# Patient Record
Sex: FEMALE | Race: BLACK OR AFRICAN AMERICAN | Employment: FULL TIME | URBAN - METROPOLITAN AREA
[De-identification: names, ages, dates, MRNs, and addresses within clinical notes are randomized per-mention and may not be internally consistent; named-entity substitution may affect disease eponyms.]

---

## 2020-09-23 ENCOUNTER — TELEPHONE (OUTPATIENT)
Dept: ORTHOPEDIC SURGERY | Age: 60
End: 2020-09-23

## 2020-09-28 DIAGNOSIS — M25.561 RIGHT KNEE PAIN, UNSPECIFIED CHRONICITY: Primary | ICD-10-CM

## 2020-10-05 ENCOUNTER — APPOINTMENT (OUTPATIENT)
Dept: PHYSICAL THERAPY | Age: 60
End: 2020-10-05

## 2020-10-06 ENCOUNTER — HOSPITAL ENCOUNTER (OUTPATIENT)
Dept: PHYSICAL THERAPY | Age: 60
Discharge: HOME | End: 2020-10-06
Payer: COMMERCIAL

## 2020-10-06 PROCEDURE — 97161 PT EVAL LOW COMPLEX 20 MIN: CPT

## 2020-10-06 PROCEDURE — 97110 THERAPEUTIC EXERCISES: CPT

## 2020-10-06 NOTE — PROGRESS NOTES
PT INITIAL EVALUATION NOTE 2-15    Patient Name: Ivan Jasso  Date:10/6/2020  : 1960  [x]  Patient  Verified  Payor: BLUE CROSS / Plan: [facility] PPO / Product Type: PPO /    In time:1530  Out time:162  Total Treatment Time (min): 50  Visit #: 1     Treatment Area: Right knee pain, unspecified chronicity [M25.561]    SUBJECTIVE  Pain Level (0-10 scale): 0/10  Any medication changes, allergies to medications, adverse drug reactions, diagnosis change, or new procedure performed?: [] No    [x] Yes (see summary sheet for update)  Subjective:     Pt reports she had a R TKA on 6/15/2020 with Carmita Russo, she completed a normal 6 weeks course of physical therapy in our clinic but scheduled a follow-up with the surgeon due to continued swelling and weakness in the right knee. She reports she has also since developed some pain the left knee but is hoping to have another follow-up with Dr. Lisa Soni to have that knee injected. She states she \"just isn't quite satisfied with where the knee is, at this point. \"  She reports a lot of right knee stiffness in the mornings and notes it doesn't loosen up until about mid-day. She reports this is making her job working as a  more challenging. She is hoping that therapy will help to get her knee where she wants it so she can work without pain. PLOF: pt is an independent community ambulator without the need for an AD  Mechanism of Injury: acute post surgical onset  Previous Treatment/Compliance: good with normal post surgical course  PMHx/Surgical Hx: R TKA  Work Hx:   Living Situation: lives alone  Pt Goals: to be able to work without pain  Barriers: none  Motivation: good   Substance use: none   Cognition: A & O x 4        OBJECTIVE/EXAMINATION  Posture:   WNL  Gait and Functional Mobility:  Pt ambulates with antalgic gait with diminished step length on the on the left  Palpation: pt has moderate palpatory tenderness along the posterior aspect of the right knee worsened with resistive hamstring testing    Hip:  Strength AROM PROM     Right Left Right Left Right Left    Flexion 4-         Extension 5         Abduction 4+         Adduction 5        Knee:  Strength AROM PROM     Right Left Right Left Right Left    Flexion 5  118  120     Extension 5  -8  -4      *All strength measures are on a scale with 5 as a maximum, if a space is left blank it was not tested.     Joint Mobility Assessment: pt has a firm end fell with terminal knee extension on the right    Mid-patella Girth:  R = 41.5 cm    Flexibility: R hamstring flexibility appears to be compromised resulting in decreased knee extension      15 min Therapeutic Exercise:  [x] See flow sheet :   Rationale: increase ROM and increase strength to improve the patients ability to perform functional mobility without pain          With   [] TE   [] TA   [] neuro   [] other: Patient Education: [x] Review HEP    [] Progressed/Changed HEP based on:   [] positioning   [] body mechanics   [] transfers   [] heat/ice application    [] other:        Pain Level (0-10 scale) post treatment: 2/10      ASSESSMENT:      [x]  See Plan of [address] Thuan Larios PT,DPT 10/6/2020

## 2020-10-06 NOTE — PROGRESS NOTES
[address], [address]  Ph: [phone]    Fax: [phone]    Plan of Care/Statement of Necessity for Physical Therapy Services  2-15    Patient name: Ivan Jasso  : 1960  Provider#: [id number]  Referral source: Zack Inman MD      Medical/Treatment Diagnosis: Right knee pain, unspecified chronicity [M25.561]     Prior Hospitalization: see medical history     Comorbidities: none  Prior Level of Function: independent community Ambulator without the need for an AD  Medications: Verified on Patient Summary List    Start of Care: 10/6/2020      Onset Date: Lucero 15, 2020       The Plan of Care and following information is based on the information from the initial evaluation. Assessment/ key information: Pt is a pleasant 62 y.o.  female who presents to our clinic with pain and mobility limitation following a recent R TKA. Her primary deficits include decreased ROM, decreased strength, and impaired gait causing pain with long distance mobility.   She will likely benefit from skilled physical therapy services to address these deficits so she can return to athletic activity and prior level of function without pain    Evaluation Complexity History LOW Complexity : Zero comorbidities / personal factors that will impact the outcome / POC; Examination LOW Complexity : 1-2 Standardized tests and measures addressing body structure, function, activity limitation and / or participation in recreation  ;Presentation LOW Complexity : Stable, uncomplicated  ;Clinical Decision Making LOW Complexity : TUG = 10 seconds  Overall Complexity Rating: LOW     Problem List: pain affecting function, decrease ROM, decrease strength, edema affecting function, impaired gait/ balance, decrease activity tolerance and decrease flexibility/ joint mobility   Treatment Plan may include any combination of the following: Therapeutic exercise, Therapeutic activities, Neuromuscular re-education, Physical agent/modality, Gait/balance training, Manual therapy, Patient education and Functional mobility training  Patient / Family readiness to learn indicated by: asking questions and interest  Persons(s) to be included in education: patient (P)  Barriers to Learning/Limitations: None  Patient Goal (s): to be able to walk on the track without pain  Patient Self Reported Health Status: good  Rehabilitation Potential: good    Short Term Goals: To be accomplished in 6 treatments:  1)  Pt to improve R knee AROM with extension by 5 degrees so that she can demonstrate improved knee flexion and less hip hike in swing phase of gait on the right  2)  Pt to be independent with HEP    Long Term Goals: To be accomplished in 12 treatments:  1)  Pt to demonstrate full active right knee extension so the patient can exhibit a normal gait pattern and decrease the energy expenditure associated with gait so she can work without pain  2)  Pt to demonstrate a 1 cm decrease in R knee mid-patellar girth to be able to return to walking on the track without knee pain beyond 2/10    Frequency / Duration: Patient to be seen 2 times per week for up to 6 weeks. Patient/ Caregiver education and instruction: activity modification and exercises    [x]  Plan of care has been reviewed with NIKKO Rodriguez, PT, DPT 10/6/2020     ________________________________________________________________________    I certify that the above Therapy Services are being furnished while the patient is under my care. I agree with the treatment plan and certify that this therapy is necessary.     [de-identified] Signature:____________________  Date:____________Time: _________

## 2020-10-12 ENCOUNTER — HOSPITAL ENCOUNTER (OUTPATIENT)
Dept: PHYSICAL THERAPY | Age: 60
Discharge: HOME | End: 2020-10-12
Payer: COMMERCIAL

## 2020-10-12 PROCEDURE — 97016 VASOPNEUMATIC DEVICE THERAPY: CPT

## 2020-10-12 PROCEDURE — 97110 THERAPEUTIC EXERCISES: CPT

## 2020-10-12 PROCEDURE — 97116 GAIT TRAINING THERAPY: CPT

## 2020-10-12 PROCEDURE — 97032 APPL MODALITY 1+ESTIM EA 15: CPT

## 2020-10-12 NOTE — PROGRESS NOTES
PT DAILY TREATMENT NOTE 2-15    Patient Name: Munira Bolton  Date:10/12/2020  : 1960  [x]  Patient  Verified  Payor: BLUE CROSS / Plan: [facility] PPO / Product Type: PPO /    In time:  Out time:  Total Treatment Time (min): 64  Visit #:  2    Treatment Area: Pain in right knee [M25.561]    SUBJECTIVE  Pain Level (0-10 scale): 7/10  Any medication changes, allergies to medications, adverse drug reactions, diagnosis change, or new procedure performed?: [x] No    [] Yes (see summary sheet for update)  Subjective functional status/changes:   [] No changes reported  Pt reports her L knee is bothering her today more than anything but she has been working on her exercises    OBJECTIVE    Modality rationale: decrease edema and decrease inflammation to improve the patients ability to perform knee extension   Min Type Additional Details      10 [x] Estim: []Att   [x]Unatt    []TENS instruct                  [x]IFC  []Premod   []NMES                     []Other:  []w/US   [x]w/vaso   []w/heat  Position: seated  Location:  R hamstrings       []  Traction: [] Cervical       []Lumbar                       [] Prone          []Supine                       []Intermittent   []Continuous Lbs:  [] before manual  [] after manual  []w/heat    []  Ultrasound: []Continuous   [] Pulsed                       at: []1MHz   []3MHz Location:  W/cm2:    [] Paraffin         Location:   []w/heat    []  Ice     []  Heat  []  Ice massage Position:  Location:    []  Laser  []  Other: Position:  Location:   10   [x]  Vasopneumatic Device Pressure:       [x] lo [] med [] hi   Temperature:  34     [x] Skin assessment post-treatment:  [x]intact [x]redness- no adverse reaction    []redness - adverse reaction:         35 min Therapeutic Exercise:  [x] See flow sheet :   Rationale: increase ROM and increase strength to improve the patients ability to perform functional mobility    16 min Gait Trainin feet without AD device on level surfaces with supervision level of assist   Rationale: increase ROM and increase strength  to improve the patients ability to ambulate without an antlagic pattern and decrease energy expenditure          With   [] TE   [] TA   [] neuro   [] other: Patient Education: [x] Review HEP    [] Progressed/Changed HEP based on:   [] positioning   [] body mechanics   [] transfers   [] heat/ice application    [] other:      Other Objective/Functional Measures: Pt needs tactile and verbal cues to perform proper heel strike and toe off on the right to allow a natural swing and early stance phase of gait     Pain Level (0-10 scale) post treatment: 6/10    ASSESSMENT/Changes in Function:   Pt is progressing and by the end of gait training today was able t ambulate without a limp at all and noted she felt it was the most comfortable and natural she has felt since her surgery. Patient will continue to benefit from skilled PT services to modify and progress therapeutic interventions, address functional mobility deficits, address ROM deficits, address strength deficits and address imbalance/dizziness to attain remaining goals. [x]  See Plan of Care  []  See progress note/recertification  []  See Discharge Summary         Progress towards goals / Updated goals:  Short Term Goals: To be accomplished in 6 treatments:  Progressing  1)  Pt to improve R knee AROM with extension by 5 degrees so that she can demonstrate improved knee flexion and less hip hike in swing phase of gait on the right  2)  Pt to be independent with HEP     Long Term Goals:  To be accomplished in 12 treatments:  Progressing  1)  Pt to demonstrate full active right knee extension so the patient can exhibit a normal gait pattern and decrease the energy expenditure associated with gait so she can work without pain  2)  Pt to demonstrate a 1 cm decrease in R knee mid-patellar girth to be able to return to walking on the track without knee pain beyond 2/10    PLAN  [x]  Upgrade activities as tolerated     [x]  Continue plan of care  []  Update interventions per flow sheet       []  Discharge due to:_  []  Other:_      Tommy Sherman, PT,DPT 10/12/2020

## 2020-10-14 ENCOUNTER — HOSPITAL ENCOUNTER (OUTPATIENT)
Dept: PHYSICAL THERAPY | Age: 60
Discharge: HOME | End: 2020-10-14
Payer: COMMERCIAL

## 2020-10-14 PROCEDURE — 97016 VASOPNEUMATIC DEVICE THERAPY: CPT

## 2020-10-14 PROCEDURE — 97110 THERAPEUTIC EXERCISES: CPT

## 2020-10-14 NOTE — PROGRESS NOTES
PT DAILY TREATMENT NOTE 2-15    Patient Name: Dariel Starks  Date:10/14/2020  : 1960  [x]  Patient  Verified  Payor: BLUE CROSS / Plan: [id number] PPO / Product Type: PPO /    In time:1500  Out time:1600  Total Treatment Time (min): 60  Visit #:  3    Treatment Area: Pain in right knee [M25.561]    SUBJECTIVE  Pain Level (0-10 scale): 2/10- pt needed education on pain scale  Any medication changes, allergies to medications, adverse drug reactions, diagnosis change, or new procedure performed?: [x] No    [] Yes (see summary sheet for update)  Subjective functional status/changes:   [] No changes reported  \"My left knee hurts more than my right. \"    OBJECTIVE    Modality rationale: decrease edema and decrease inflammation to improve the patients ability to perform knee extension   Min Type Additional Details       [] Estim: []Att   []Unatt    []TENS instruct                  []IFC  []Premod   []NMES                     []Other:  []w/US   []w/vaso   []w/heat  Position: seated  Location:  R hamstrings       []  Traction: [] Cervical       []Lumbar                       [] Prone          []Supine                       []Intermittent   []Continuous Lbs:  [] before manual  [] after manual  []w/heat    []  Ultrasound: []Continuous   [] Pulsed                       at: []1MHz   []3MHz Location:  W/cm2:    [] Paraffin         Location:   []w/heat    []  Ice     []  Heat  []  Ice massage Position:  Location:    []  Laser  []  Other: Position:  Location:   10   [x]  Vasopneumatic Device Pressure:       [] lo [x] med [] hi   Temperature:  34     [x] Skin assessment post-treatment:  [x]intact [x]redness- no adverse reaction    []redness - adverse reaction:         45 min Therapeutic Exercise:  [x] See flow sheet :   Rationale: increase ROM and increase strength to improve the patients ability to perform functional mobility     min Gait Training:    Rationale: increase ROM and increase strength to improve the patients ability to ambulate without an antlagic pattern and decrease energy expenditure          With   [] TE   [] TA   [] neuro   [] other: Patient Education: [x] Review HEP    [] Progressed/Changed HEP based on:   [] positioning   [] body mechanics   [] transfers   [] heat/ice application    [] other:      Other Objective/Functional Measures: Pt needs tactile and verbal cues to perform proper heel strike and toe off on the right to allow a natural swing and early stance phase of gait. Pain Level (0-10 scale) post treatment: 0/10    ASSESSMENT/Changes in Function:   Addition of prone hand to encourage further right knee extension prior to gait on treadmill today. Addition of treadmill to encourage proper heel strike and equal weight bearing on B LE during gait. Pt tolerated increases well. Primary complaint is of left, non-surgical, knee. Patient will continue to benefit from skilled PT services to modify and progress therapeutic interventions, address functional mobility deficits, address ROM deficits, address strength deficits and address imbalance/dizziness to attain remaining goals. [x]  See Plan of Care  []  See progress note/recertification  []  See Discharge Summary         Progress towards goals / Updated goals:  Short Term Goals: To be accomplished in 6 treatments:  Progressing  1)  Pt to improve R knee AROM with extension by 5 degrees so that she can demonstrate improved knee flexion and less hip hike in swing phase of gait on the right  2)  Pt to be independent with HEP     Long Term Goals:  To be accomplished in 12 treatments:  Progressing  1)  Pt to demonstrate full active right knee extension so the patient can exhibit a normal gait pattern and decrease the energy expenditure associated with gait so she can work without pain  2)  Pt to demonstrate a 1 cm decrease in R knee mid-patellar girth to be able to return to walking on the track without knee pain beyond 2/10    PLAN  [x]  Upgrade activities as tolerated     [x]  Continue plan of care  []  Update interventions per flow sheet       []  Discharge due to:_  []  Other:_      Arturo Rose PT,DPT 10/14/2020

## 2020-10-19 ENCOUNTER — APPOINTMENT (OUTPATIENT)
Dept: PHYSICAL THERAPY | Age: 60
End: 2020-10-19
Payer: COMMERCIAL

## 2020-10-20 ENCOUNTER — HOSPITAL ENCOUNTER (OUTPATIENT)
Dept: PHYSICAL THERAPY | Age: 60
Discharge: HOME | End: 2020-10-20
Payer: COMMERCIAL

## 2020-10-20 PROCEDURE — 97110 THERAPEUTIC EXERCISES: CPT

## 2020-10-20 NOTE — PROGRESS NOTES
PT DAILY TREATMENT NOTE 2-15    Patient Name: Munira Bolton  Date:10/20/2020  : 1960  [x]  Patient  Verified  Payor: BLUE CROSS / Plan: [facility] PPO / Product Type: PPO /    In time:0309pm  Out time:411 pm  Total Treatment Time (min): 58  Visit #:  4    Treatment Area: Pain in right knee [M25.561]    SUBJECTIVE  Pain Level (0-10 scale): 0/10 on R knee however L knee is 8/10  Any medication changes, allergies to medications, adverse drug reactions, diagnosis change, or new procedure performed?: [x] No    [] Yes (see summary sheet for update)  Subjective functional status/changes:   [] No changes reported  Pt reports having increased pain in her L knee today and considering getting injection in it. Pt noted that she is not having any pain in her sx knee.    OBJECTIVE    Modality rationale: decrease edema, decrease inflammation and decrease pain to improve the patients ability to tolerate knee movement bilat   Min Type Additional Details       [] Estim: []Att   []Unatt    []TENS instruct                  []IFC  []Premod   []NMES                     []Other:  []w/US   []w/ice   []w/heat  Position:  Location:       []  Traction: [] Cervical       []Lumbar                       [] Prone          []Supine                       []Intermittent   []Continuous Lbs:  [] before manual  [] after manual  []w/heat    []  Ultrasound: []Continuous   [] Pulsed                       at: []1MHz   []3MHz Location:  W/cm2:    [] Paraffin         Location:   []w/heat   10 [x]  Ice     []  Heat  []  Ice massage Position: seated LE ext bilat Location: bilat knees    []  Laser  []  Other: Position:  Location:      []  Vasopneumatic Device Pressure:       [] lo [] med [] hi   Temperature:      [x] Skin assessment post-treatment:  [x]intact []redness- no adverse reaction    []redness - adverse reaction:         52 min Therapeutic Exercise:  [x] See flow sheet :   Rationale: increase ROM and increase strength to improve the patients ability to increase functional knee motion bilat with decreased c/o. With   [] TE   [] TA   [] neuro   [] other: Patient Education: [x] Review HEP    [] Progressed/Changed HEP based on:   [] positioning   [] body mechanics   [] transfers   [] heat/ice application    [] other:        Pain Level (0-10 scale) post treatment: 2/10 L, 0/10 R    ASSESSMENT/Changes in Function:   Pt tolerated all ex with reports of increased tolerance to active knee movement with progression of ex and stretching. Patient will continue to benefit from skilled PT services to modify and progress therapeutic interventions, address functional mobility deficits, address ROM deficits and address strength deficits to attain remaining goals.      [x]  See Plan of Care  []  See progress note/recertification  []  See Discharge Summary         Progress towards goals / Updated goals:  Short Term Goals: To be accomplished in 6 treatments:  Progressing  1)  Pt to improve R knee AROM with extension by 5 degrees so that she can demonstrate improved knee flexion and less hip hike in swing phase of gait on the right  2)  Pt to be independent with HEP     Long Term Goals: To be accomplished in 12 treatments:  Progressing  1)  Pt to demonstrate full active right knee extension so the patient can exhibit a normal gait pattern and decrease the energy expenditure associated with gait so she can work without pain  2)  Pt to demonstrate a 1 cm decrease in R knee mid-patellar girth to be able to return to walking on the track without knee pain beyond 2/10    PLAN  [x]  Upgrade activities as tolerated     [x]  Continue plan of care  []  Update interventions per flow sheet       []  Discharge due to:_  []  Other:_      Kamari Arevalo  10/20/2020

## 2020-10-21 ENCOUNTER — APPOINTMENT (OUTPATIENT)
Dept: PHYSICAL THERAPY | Age: 60
End: 2020-10-21
Payer: COMMERCIAL

## 2020-10-26 ENCOUNTER — HOSPITAL ENCOUNTER (OUTPATIENT)
Dept: PHYSICAL THERAPY | Age: 60
Discharge: HOME | End: 2020-10-26
Payer: COMMERCIAL

## 2020-10-26 PROCEDURE — 97110 THERAPEUTIC EXERCISES: CPT

## 2020-10-26 NOTE — PROGRESS NOTES
PT DAILY TREATMENT NOTE 2-15    Patient Name: Ed Zapata  Date:10/26/2020  : 1960  [x]  Patient  Verified  Payor: BLUE CROSS / Plan: [facility] PPO / Product Type: PPO /    In time:0300pm  Out time:401  Total Treatment Time (min): 61  Visit #:  5    Treatment Area: Pain in right knee [M25.561]    SUBJECTIVE  Pain Level (0-10 scale): 0/10 on R knee however L knee is 8/10  Any medication changes, allergies to medications, adverse drug reactions, diagnosis change, or new procedure performed?: [x] No    [] Yes (see summary sheet for update)  Subjective functional status/changes:   [] No changes reported  \"the rain always makes me achy. \"      OBJECTIVE    Modality rationale: decrease edema, decrease inflammation and decrease pain to improve the patients ability to tolerate knee movement bilat   Min Type Additional Details       [] Estim: []Att   []Unatt    []TENS instruct                  []IFC  []Premod   []NMES                     []Other:  []w/US   []w/ice   []w/heat  Position:  Location:       []  Traction: [] Cervical       []Lumbar                       [] Prone          []Supine                       []Intermittent   []Continuous Lbs:  [] before manual  [] after manual  []w/heat    []  Ultrasound: []Continuous   [] Pulsed                       at: []1MHz   []3MHz Location:  W/cm2:    [] Paraffin         Location:   []w/heat   8 []  Ice     [x]  Heat  []  Ice massage Position: prone   Location: right hamstring    []  Laser  []  Other: Position:  Location:      []  Vasopneumatic Device Pressure:       [] lo [] med [] hi   Temperature:      [x] Skin assessment post-treatment:  [x]intact []redness- no adverse reaction    []redness - adverse reaction:         53 min Therapeutic Exercise:  [x] See flow sheet :   Rationale: increase ROM and increase strength to improve the patients ability to increase functional knee motion bilat with decreased c/o.                With   [] TE   [] TA   [] neuro   [] other: Patient Education: [x] Review HEP    [] Progressed/Changed HEP based on:   [] positioning   [] body mechanics   [] transfers   [] heat/ice application    [] other:        Pain Level (0-10 scale) post treatment: 2/10 L, 0/10 R    ASSESSMENT/Changes in Function:   Pt tolerated all ex with reports of increased tolerance to active knee movement with progression of ex and stretching. Focused on increasing knee extension with prone stretch with heat, followed by gait on treadmill and gait within room to formulate more equalized gait pattern. Pt frequently complaints of left knee pain, which limits her mobility, however is aware prescription is only to treat right knee. Patient will continue to benefit from skilled PT services to modify and progress therapeutic interventions, address functional mobility deficits, address ROM deficits and address strength deficits to attain remaining goals.      [x]  See Plan of Care  []  See progress note/recertification  []  See Discharge Summary         Progress towards goals / Updated goals:  Short Term Goals: To be accomplished in 6 treatments:  Progressing  1)  Pt to improve R knee AROM with extension by 5 degrees so that she can demonstrate improved knee flexion and less hip hike in swing phase of gait on the right  2)  Pt to be independent with HEP     Long Term Goals: To be accomplished in 12 treatments:  Progressing  1)  Pt to demonstrate full active right knee extension so the patient can exhibit a normal gait pattern and decrease the energy expenditure associated with gait so she can work without pain  2)  Pt to demonstrate a 1 cm decrease in R knee mid-patellar girth to be able to return to walking on the track without knee pain beyond 2/10    PLAN  [x]  Upgrade activities as tolerated     [x]  Continue plan of care  []  Update interventions per flow sheet       []  Discharge due to:_  []  Other:_      Parish Nova, PT, DPT  10/26/2020

## 2020-10-28 ENCOUNTER — HOSPITAL ENCOUNTER (OUTPATIENT)
Dept: PHYSICAL THERAPY | Age: 60
Discharge: HOME | End: 2020-10-28
Payer: COMMERCIAL

## 2020-11-02 ENCOUNTER — HOSPITAL ENCOUNTER (OUTPATIENT)
Dept: PHYSICAL THERAPY | Age: 60
Discharge: HOME | End: 2020-11-02
Payer: COMMERCIAL

## 2020-11-02 PROCEDURE — 97110 THERAPEUTIC EXERCISES: CPT

## 2020-11-02 NOTE — PROGRESS NOTES
PT DAILY TREATMENT NOTE 2-15    Patient Name: Trevon Parker  Date:2020  : 1960  [x]  Patient  Verified  Payor: BLUE CROSS / Plan: [facility] PPO / Product Type: PPO /    In time: 4482 PM Out time:4:10 PM  Total Treatment Time (min): 55  Visit #: 6    Treatment Area: Pain in right knee [M25.561]    SUBJECTIVE  Pain Level (0-10 scale): 4  Any medication changes, allergies to medications, adverse drug reactions, diagnosis change, or new procedure performed?: [x] No    [] Yes (see summary sheet for update)  Subjective functional status/changes:   [] No changes reported  My knee continues to be painful. I may continue to get the gel treatment. I still find it difficulty to straighten my knee. OBJECTIVE      55 min Therapeutic Exercise:  [x] See flow sheet :   Rationale: increase ROM, increase strength, improve coordination, improve balance and increase proprioception to improve the patients ability to return to normal gait. With   [] TE   [] TA   [] neuro   [] other: Patient Education: [x] Review HEP    [] Progressed/Changed HEP based on:   [] positioning   [] body mechanics   [] transfers   [] heat/ice application    [] other:      Pain Level (0-10 scale) post treatment: 4    ASSESSMENT/Changes in Function:     Patient continues to have right knee weakness and extension lag. Continue to work on strengthening program of the quads and establish a comprehensive exercise program. Patient will continue to benefit from skilled PT services to address ROM deficits and address strength deficits to attain remaining goals.      []  See Plan of Care  []  See progress note/recertification  []  See Discharge Summary         Progress towards goals / Updated goals:    Short Term Goals: To be accomplished in 6 treatments:   1)  Pt to improve R knee AROM with extension by 5 degrees so that she can demonstrate improved knee flexion and less hip hike in swing phase of gait on the right  2)  Pt to be independent with HEP     Long Term Goals: To be accomplished in 12 treatments:    1)  Pt to demonstrate full active right knee extension so the patient can exhibit a normal gait pattern and decrease the energy expenditure associated with gait so she can work without pain  2)  Pt to demonstrate a 1 cm decrease in R knee mid-patellar girth to be able to return to walking on the track without knee pain beyond 2/10       PLAN  []  Upgrade activities as tolerated     [x]  Continue plan of care  []  Update interventions per flow sheet       []  Discharge due to:_  []  Other:_      David Astudillo, PT 11/2/2020

## 2020-11-04 ENCOUNTER — HOSPITAL ENCOUNTER (OUTPATIENT)
Dept: PHYSICAL THERAPY | Age: 60
Discharge: HOME | End: 2020-11-04
Payer: COMMERCIAL

## 2020-11-04 PROCEDURE — 97110 THERAPEUTIC EXERCISES: CPT

## 2020-11-04 NOTE — PROGRESS NOTES
PT DAILY TREATMENT NOTE 2-15    Patient Name: Britta Rosenberg  Date:2020  : 1960  [x]  Patient  Verified  Payor: [employer]UE [employer]OTT / Plan: [id number] PPO / Product Type: PPO /    In time:3:00 PM  Out time:4:00 PM  Total Treatment Time (min): 60  Visit #:  7    Treatment Area: Pain in right knee [M25.561]    SUBJECTIVE  Pain Level (0-10 scale): 3    Any medication changes, allergies to medications, adverse drug reactions, diagnosis change, or new procedure performed?: [x] No    [] Yes (see summary sheet for update)    Subjective functional status/changes:   [] No changes reported    I hope to get rid of this limp soon. OBJECTIVE      60 min Therapeutic Exercise:  [x] See flow sheet :   Rationale: increase ROM, increase strength, improve coordination, improve balance and increase proprioception to improve the patients ability to return to  Normal gait. With   [] TE   [] TA   [] neuro   [] other: Patient Education: [x] Review HEP    [] Progressed/Changed HEP based on:   [] positioning   [] body mechanics   [] transfers   [] heat/ice application    [] other:      Other Objective/Functional Measures:   OBJECTIVE/EXAMINATION  Posture:   WNL  Gait and Functional Mobility:  Pt ambulates with antalgic gait with diminished step length on the on the left  Palpation: pt has moderate palpatory tenderness along the posterior aspect of the right knee worsened with resistive hamstring testing               Hip:   Strength AROM PROM       Right Left Right Left Right Left     Flexion 4-               Extension 5               Abduction 4+               Adduction 5             Knee:   Strength AROM PROM       Right Left Right Left Right Left     Flexion 5   118   120       Extension 5   -8   -4        *All strength measures are on a scale with 5 as a maximum, if a space is left blank it was not tested.     Joint Mobility Assessment: pt has a firm end fell with terminal knee extension on the right     Mid-patella Girth:  R = 41.5 cm     Flexibility: R hamstring flexibility appears to be compromised       Pain Level (0-10 scale) post treatment: 3    ASSESSMENT/Changes in Function:      Patient continues to have right knee weakness and extension lag. Continue to work on strengthening program of the quads and establish a comprehensive exercise program. Patient will continue to benefit from skilled PT services to address ROM deficits and address strength deficits to attain remaining goals  Patient will continue to benefit from skilled PT services to address functional mobility deficits, address ROM deficits and address strength deficits to attain remaining goals.      [x]  See Plan of Care  []  See progress note/recertification  []  See Discharge Summary         Progress towards goals / Updated goals:  *Short Term Goals: To be accomplished in 6 treatments:   Goal: Pt to improve R knee AROM with extension by 5 degrees so that she can demonstrate improved knee flexion and less hip hike in swing phase of gait on the right  Status at discharge:    Goal:  Pt to be independent with HEP  Status at discharge     Long Term Goals: To be accomplished in 12 treatments:    Goal:  Pt to demonstrate full active right knee extension so the patient can exhibit a normal gait pattern and decrease the energy expenditure associated with gait so she can work without pain  Status at discharge:    Goal:  Pt to demonstrate a 1 cm decrease in R knee mid-patellar girth to be able to return to walking on the track without knee pain beyond 2/10  Status at discharge:  PLAN  []  Upgrade activities as tolerated     [x]  Continue plan of care  []  Update interventions per flow sheet       []  Discharge due to:_  []  Other:_      Christina Pierre, PT 11/4/2020

## 2020-11-11 ENCOUNTER — APPOINTMENT (OUTPATIENT)
Dept: PHYSICAL THERAPY | Age: 60
End: 2020-11-11
Payer: COMMERCIAL

## 2020-11-16 ENCOUNTER — HOSPITAL ENCOUNTER (OUTPATIENT)
Dept: PHYSICAL THERAPY | Age: 60
Discharge: HOME | End: 2020-11-16
Payer: COMMERCIAL

## 2020-11-16 PROCEDURE — 97110 THERAPEUTIC EXERCISES: CPT

## 2020-11-16 NOTE — PROGRESS NOTES
PT DAILY TREATMENT NOTE 2-15    Patient Name: Lili Moon  Date:2020  : 1960  [x]  Patient  Verified  Payor: BLUE CROSS / Plan: [facility] PPO / Product Type: PPO /    In time:15:08  Out time:16:02  Total Treatment Time (min): 47  Visit #: 8    Treatment Area: Pain in right knee [M25.561]    SUBJECTIVE  Pain Level (0-10 scale): 3/10  Any medication changes, allergies to medications, adverse drug reactions, diagnosis change, or new procedure performed?: [x] No    [] Yes (see summary sheet for update)  Subjective functional status/changes:   [] No changes reported  Pt reports she is doing well, just having some stiffness. OBJECTIVE    Modality rationale: decrease edema, decrease inflammation and decrease pain to improve the patients ability to ambulate with normalized gait. Min Type Additional Details       [] Estim: []Att   []Unatt    []TENS instruct                  []IFC  []Premod   []NMES                     []Other:  []w/US   []w/ice   []w/heat  Position:  Location:       []  Traction: [] Cervical       []Lumbar                       [] Prone          []Supine                       []Intermittent   []Continuous Lbs:  [] before manual  [] after manual  []w/heat    []  Ultrasound: []Continuous   [] Pulsed                       at: []1MHz   []3MHz Location:  W/cm2:    [] Paraffin         Location:   []w/heat   10 [x]  Ice     []  Heat  []  Ice massage Position: Seated  Location: Ice pack provided over L knee due to pain with heel prop    []  Laser  []  Other: Position:  Location:      []  Vasopneumatic Device Pressure:       [] lo [] med [] hi   [] w/ ice    Temperature:      [x] Skin assessment post-treatment:  [x]intact []redness- no adverse reaction    []redness - adverse reaction:       44 min Therapeutic Exercise:  [x] See flow sheet :   Rationale: increase ROM and increase strength to improve the patients ability to ambulate with normalized gait.          With [x] TE   [] TA   [] neuro   [] other: Patient Education: [x] Review HEP    [] Progressed/Changed HEP based on:   [] positioning   [] body mechanics   [] transfers   [] heat/ice application    [x] other:      Other Objective/Functional Measures: Added step ups. Increased time for fitter stretch. Pain Level (0-10 scale) post treatment: 2/10    ASSESSMENT/Changes in Function:   Pt tolerated therapy session well today. Emphasis of treatment was on stretching and exercises addressing ROM and LE strength due to patient reports of tightness. Progressed to standing strengthening exercises to promote functional strengthening. Pt requires verbal cueing for proper performance of exercises. Patient will continue to benefit from skilled PT services to modify and progress therapeutic interventions, address functional mobility deficits, address ROM deficits, address strength deficits, analyze and address soft tissue restrictions and analyze and cue movement patterns to attain remaining goals.      [x]  See Plan of Care  []  See progress note/recertification  []  See Discharge Summary         Progress towards goals / Updated goals:  Short Term Goals: To be accomplished in 6 treatments:   Goal: Pt to improve R knee AROM with extension by 5 degrees so that she can demonstrate improved knee flexion and less hip hike in swing phase of gait on the right  Status at discharge:     Goal:  Pt to be independent with HEP  Status at discharge     Long Term Goals: To be accomplished in 12 treatments:    Goal:  Pt to demonstrate full active right knee extension so the patient can exhibit a normal gait pattern and decrease the energy expenditure associated with gait so she can work without pain  Status at discharge:     Goal:  Pt to demonstrate a 1 cm decrease in R knee mid-patellar girth to be able to return to walking on the track without knee pain beyond 2/10  Status at discharge:    PLAN  [x]  Upgrade activities as tolerated     [x] Continue plan of care  []  Update interventions per flow sheet       []  Discharge due to:_  []  Other:_      Joellen Rossi PT, DPT 11/16/2020

## 2020-11-18 ENCOUNTER — APPOINTMENT (OUTPATIENT)
Dept: PHYSICAL THERAPY | Age: 60
End: 2020-11-18
Payer: COMMERCIAL

## 2020-11-23 ENCOUNTER — HOSPITAL ENCOUNTER (OUTPATIENT)
Dept: PHYSICAL THERAPY | Age: 60
Discharge: HOME | End: 2020-11-23
Payer: COMMERCIAL

## 2020-11-23 PROCEDURE — 97110 THERAPEUTIC EXERCISES: CPT

## 2020-11-25 ENCOUNTER — APPOINTMENT (OUTPATIENT)
Dept: PHYSICAL THERAPY | Age: 60
End: 2020-11-25
Payer: COMMERCIAL

## 2020-11-30 ENCOUNTER — APPOINTMENT (OUTPATIENT)
Dept: PHYSICAL THERAPY | Age: 60
End: 2020-11-30
Payer: COMMERCIAL

## 2020-12-02 ENCOUNTER — APPOINTMENT (OUTPATIENT)
Dept: PHYSICAL THERAPY | Age: 60
End: 2020-12-02

## 2020-12-04 ENCOUNTER — APPOINTMENT (OUTPATIENT)
Dept: PHYSICAL THERAPY | Age: 60
End: 2020-12-04

## 2020-12-07 ENCOUNTER — APPOINTMENT (OUTPATIENT)
Dept: PHYSICAL THERAPY | Age: 60
End: 2020-12-07

## 2020-12-09 ENCOUNTER — APPOINTMENT (OUTPATIENT)
Dept: PHYSICAL THERAPY | Age: 60
End: 2020-12-09

## 2020-12-14 DIAGNOSIS — M25.561 RIGHT KNEE PAIN, UNSPECIFIED CHRONICITY: Primary | ICD-10-CM

## 2020-12-14 RX ORDER — NAPROXEN 500 MG/1
500 TABLET ORAL 2 TIMES DAILY WITH MEALS
COMMUNITY

## 2020-12-14 RX ORDER — NAPROXEN 500 MG/1
500 TABLET ORAL 2 TIMES DAILY WITH MEALS
Qty: 14 TAB | Refills: 0 | OUTPATIENT
Start: 2020-12-14

## 2021-02-03 NOTE — PROGRESS NOTES
[address]  [place], [place]  Ph: [phone]    Fax: [phone]    Discharge Summary  2-15    Patient name: Catherine Walsh  : 1960  Provider#: [id number]  Referral source: Paul Sweeney MD      Medical/Treatment Diagnosis: Pain in right knee [M25.561]     Prior Hospitalization: see medical history     Comorbidities: See Plan of Care  Prior Level of Function:See Plan of Care  Medications: Verified on Patient Summary List    Start of Care: 10/6/2020      Onset Date:6/15/2020   Visits from Start of Care: 9 Missed Visits: 4  Reporting Period : 10/6/2020 to 2/3/2021      ASSESSMENT/SUMMARY OF CARE:   Pt was progressing well at the the time of her last therapy visit but stopped coming to therapy without further contact with our office. As such she will be discharged due to non-compliance with her plan of care at this time.         RECOMMENDATIONS:  [x]Discontinue therapy: []Patient has reached or is progressing toward set goals      [x]Patient is non-compliant or has abdicated      []Due to lack of appreciable progress towards set goals      []Other    Carolynn Gonzalez, PT 2/3/2021

## 2021-04-28 DIAGNOSIS — M79.672 LEFT FOOT PAIN: Primary | ICD-10-CM

## 2022-01-26 DIAGNOSIS — M25.571 RIGHT ANKLE PAIN, UNSPECIFIED CHRONICITY: ICD-10-CM

## 2022-01-26 DIAGNOSIS — M25.572 LEFT ANKLE PAIN, UNSPECIFIED CHRONICITY: Primary | ICD-10-CM

## 2022-02-09 DIAGNOSIS — M25.571 RIGHT ANKLE PAIN, UNSPECIFIED CHRONICITY: Primary | ICD-10-CM

## 2022-02-09 DIAGNOSIS — M25.572 LEFT ANKLE PAIN, UNSPECIFIED CHRONICITY: ICD-10-CM

## 2022-08-23 ENCOUNTER — OFFICE VISIT (OUTPATIENT)
Dept: ORTHOPEDIC SURGERY | Age: 62
End: 2022-08-23
Payer: COMMERCIAL

## 2022-08-23 VITALS — WEIGHT: 167 LBS | HEIGHT: 67 IN | BODY MASS INDEX: 26.21 KG/M2

## 2022-08-23 DIAGNOSIS — M17.12 OSTEOARTHRITIS OF LEFT KNEE, UNSPECIFIED OSTEOARTHRITIS TYPE: ICD-10-CM

## 2022-08-23 DIAGNOSIS — M25.562 LEFT KNEE PAIN, UNSPECIFIED CHRONICITY: Primary | ICD-10-CM

## 2022-08-23 DIAGNOSIS — M25.562 LEFT KNEE PAIN, UNSPECIFIED CHRONICITY: ICD-10-CM

## 2022-08-23 PROBLEM — R91.8 MULTIPLE PULMONARY NODULES: Status: ACTIVE | Noted: 2022-08-23

## 2022-08-23 PROBLEM — D51.3 OTHER DIETARY VITAMIN B12 DEFICIENCY ANEMIA: Status: ACTIVE | Noted: 2021-08-15

## 2022-08-23 PROBLEM — R63.4 ABNORMAL WEIGHT LOSS: Status: ACTIVE | Noted: 2022-08-23

## 2022-08-23 PROBLEM — E55.9 VITAMIN D DEFICIENCY: Status: ACTIVE | Noted: 2022-08-23

## 2022-08-23 PROBLEM — I10 HYPERTENSION: Status: ACTIVE | Noted: 2021-08-15

## 2022-08-23 PROBLEM — D64.9 ANEMIA: Status: ACTIVE | Noted: 2022-08-23

## 2022-08-23 PROBLEM — M89.8X9 BONE PAIN: Status: ACTIVE | Noted: 2022-08-23

## 2022-08-23 PROBLEM — S86.912A STRAIN OF LEFT KNEE: Status: ACTIVE | Noted: 2021-08-15

## 2022-08-23 PROCEDURE — 20611 DRAIN/INJ JOINT/BURSA W/US: CPT | Performed by: ORTHOPAEDIC SURGERY

## 2022-08-23 PROCEDURE — 99214 OFFICE O/P EST MOD 30 MIN: CPT | Performed by: ORTHOPAEDIC SURGERY

## 2022-08-23 RX ORDER — LIDOCAINE HYDROCHLORIDE 10 MG/ML
10 INJECTION, SOLUTION EPIDURAL; INFILTRATION; INTRACAUDAL; PERINEURAL
COMMUNITY

## 2022-08-23 RX ORDER — METHOTREXATE 2.5 MG/1
TABLET ORAL
COMMUNITY
Start: 2022-06-06

## 2022-08-23 RX ORDER — LETROZOLE 2.5 MG/1
2.5 TABLET, FILM COATED ORAL
COMMUNITY

## 2022-08-23 RX ORDER — ASCORBIC ACID 500 MG
500 TABLET ORAL DAILY
COMMUNITY

## 2022-08-23 RX ORDER — DICLOFENAC SODIUM 10 MG/G
1 GEL TOPICAL
COMMUNITY
Start: 2022-06-27

## 2022-08-23 RX ORDER — CLOBETASOL PROPIONATE 0.5 MG/G
CREAM TOPICAL 2 TIMES DAILY
COMMUNITY
Start: 2022-07-08

## 2022-08-23 RX ORDER — LANOLIN ALCOHOL/MO/W.PET/CERES
CREAM (GRAM) TOPICAL
COMMUNITY

## 2022-08-23 RX ORDER — LIDOCAINE HYDROCHLORIDE 10 MG/ML
9 INJECTION INFILTRATION; PERINEURAL ONCE
Status: COMPLETED | OUTPATIENT
Start: 2022-08-23 | End: 2022-08-23

## 2022-08-23 RX ORDER — MELOXICAM 15 MG/1
TABLET ORAL
COMMUNITY
Start: 2022-08-19

## 2022-08-23 RX ORDER — TRIAMCINOLONE ACETONIDE 40 MG/ML
40 INJECTION, SUSPENSION INTRA-ARTICULAR; INTRAMUSCULAR ONCE
Status: COMPLETED | OUTPATIENT
Start: 2022-08-23 | End: 2022-08-23

## 2022-08-23 RX ORDER — IBUPROFEN 600 MG/1
600 TABLET ORAL
COMMUNITY
Start: 2022-06-24

## 2022-08-23 RX ORDER — TRIAMCINOLONE ACETONIDE 40 MG/ML
40 INJECTION, SUSPENSION INTRA-ARTICULAR; INTRAMUSCULAR
COMMUNITY

## 2022-08-23 RX ADMIN — LIDOCAINE HYDROCHLORIDE 9 ML: 10 INJECTION INFILTRATION; PERINEURAL at 15:00

## 2022-08-23 RX ADMIN — TRIAMCINOLONE ACETONIDE 40 MG: 40 INJECTION, SUSPENSION INTRA-ARTICULAR; INTRAMUSCULAR at 15:00

## 2022-08-23 NOTE — PROGRESS NOTES
Name: Tea Garcia    : 1960     Service Dept: [facility]    Chief Complaint   Patient presents with    Knee Pain     Left           Visit Vitals  Ht 5' 7\" (1.702 m)   Wt 167 lb (75.8 kg)   BMI 26.16 kg/m²        No Known Allergies     Current Outpatient Medications   Medication Sig Dispense Refill    clobetasoL (TEMOVATE) 0.05 % topical cream Apply  to affected area two (2) times a day. diclofenac (VOLTAREN) 1 % gel Apply 1 g to affected area every four (4) hours as needed. ascorbic acid, vitamin C, (VITAMIN C) 500 mg tablet Take 500 mg by mouth daily. ascorbic acid (VITAMIN C) 500 mg chewable tablet Vitamin C With Mariana Hips 500 mg chewable tablet   1 TABLET,CHEWABLE ORALLY DAILY. ibuprofen (MOTRIN) 600 mg tablet Take 600 mg by mouth every eight (8) hours as needed. letrozole (FEMARA) 2.5 mg tablet Take 2.5 mg by mouth nightly. lidocaine, PF, (XYLOCAINE) 10 mg/mL (1 %) injection 10 mg.      meloxicam (MOBIC) 15 mg tablet       methotrexate (RHEUMATREX) 2.5 mg tablet TAKE 6 TABLET BY MOUTH ONCE WEEKLY      triamcinolone acetonide (Kenalog) 40 mg/mL injection 40 mg.      naproxen (NAPROSYN) 500 mg tablet Take 500 mg by mouth two (2) times daily (with meals).        Current Facility-Administered Medications   Medication Dose Route Frequency Provider Last Rate Last Admin    lidocaine (XYLOCAINE) 10 mg/mL (1 %) injection 9 mL  9 mL Other ONCE Antonio hSi MD        triamcinolone acetonide (KENALOG-40) 40 mg/mL injection 40 mg  40 mg Intra artICUlar ONCE Regino Tang MD          Patient Active Problem List   Diagnosis Code    Abnormal weight loss R63.4    Anemia D64.9    Arthralgia of left knee M25.562    Bone pain M89.8X9    Hypertension I10    Hypertrophy of breast N62    Malignant neoplasm of breast (female), unspecified site C50.919    Multiple pulmonary nodules R91.8    Other dietary vitamin B12 deficiency anemia D51.3 Strain of left knee [id number]    Vitamin D deficiency E55.9      No family history on file. Social History     Socioeconomic History    Marital status: SINGLE      No past surgical history on file. No past medical history on file. I have reviewed and agree with [address] [place] [address] Nw and ROS and intake form in chart and the record furthermore I have reviewed prior medical record(s) regarding this patients care during this appointment. Review of Systems:   Patient is a pleasant appearing individual, appropriately dressed, well hydrated, well nourished, who is alert, appropriately oriented for age, and in no acute distress with a normal gait and normal affect who does not appear to be in any significant pain. Physical Exam:  Left Knee -Decrease range of motion with flexion, Knee arc of greater than 50 degrees, Some crepitation, Grossly neurovascularly intact, Good cap refill, No skin lesion, Moderate swelling, some gross instability, Some quadriceps weakness, Kellgren and Stephon at least grade 3    Right Knee - Full Range of Motion, No crepitation, Grossly neurovascularly intact, Good cap refill, No skin lesion, No swelling, No gross instability, No quadriceps weakness     Procedure Documentation:    I discussed in detail the risks, benefits and complications of an injection which included but are not limited to infection, skin reactions, hot swollen joint, and anaphylaxis with the patient. The patient verbalized understanding and gave informed consent for the injection. The patient's knee was flexed to 90° and the skin prepped using sterile alcohol solution. A sterile needle was inserted into the left knee and the mixture of 9 mL Lidocaine 1%, 1 mL Kenalog 40 mg was injected under sterile technique. The needle was withdrawn and the puncture site sealed with a Band-Aid.       Technique: Under sterile conditions a [employer] ultrasound unit with a variable frequency (7.0-14.0 MHz) linear transducer was used to localize the placement of needle into the left knee joint. Findings: Successful needle placement for knee injection. Final images were taken and saved for permanent record. The patient tolerated the injection well. The patient was instructed to call the office immediately if there is any pain, redness, warmth, fever, or chills. Encounter Diagnoses     ICD-10-CM ICD-9-CM   1. Left knee pain, unspecified chronicity  M25.562 719.46   2. Osteoarthritis of left knee, unspecified osteoarthritis type  M17.12 715.96       HPI:  The patient is here with a chief complaint of left knee pain, dull, throbbing pain, progressively getting worse. Failed conservative treatment. X-rays are positive for severe OA. Assessment/Plan:  Plan will be for left knee cortisone injection. We will match it up to the other side for left total knee replacement. General medical clearance. No history of blood clots, does not take any blood thinners. As part of continued conservative pain management options the patient was advised to utilize Tylenol or OTC NSAIDS as long as it is not medically contraindicated. Return to Office: Follow-up and Dispositions    Return for schedule for surgery. Scribed by Jose Weber LPN as dictated by [facility] NATHAN Rose MD.  Documentation True and Accepted Antonio Rose MD

## 2022-08-23 NOTE — PATIENT INSTRUCTIONS
Knee Arthritis: Care Instructions  Your Care Instructions     Knee arthritis is a breakdown of the cartilage that cushions your knee joint. When the cartilage wears down, your bones rub against each other. This causes pain and stiffness. Knee arthritis tends to get worse with time. Treatment for knee arthritis involves reducing pain, making the leg muscles stronger, and staying at a healthy body weight. The treatment usually does not improve the health of the cartilage, but it can reduce pain and improve how well your knee works. You can take simple measures to protect your knee joints, ease your pain, and help you stay active. Follow-up care is a key part of your treatment and safety. Be sure to make and go to all appointments, and call your doctor if you are having problems. It's also a good idea to know your test results and keep a list of the medicines you take. How can you care for yourself at home? Know that knee arthritis will cause more pain on some days than on others. Stay at a healthy weight. Lose weight if you are overweight. When you stand up, the pressure on your knees from every pound of body weight is multiplied four times. So if you lose 10 pounds, you will reduce the pressure on your knees by 40 pounds. Talk to your doctor or physical therapist about exercises that will help ease joint pain. Stretch to help prevent stiffness and to prevent injury before you exercise. You may enjoy gentle forms of yoga to help keep your knee joints and muscles flexible. Walk instead of jog. Ride a bike. This makes your thigh muscles stronger and takes pressure off your knee. Wear well-fitting and comfortable shoes. Exercise in chest-deep water. This can help you exercise longer with less pain. Avoid exercises that include squatting or kneeling. They can put a lot of strain on your knees. Talk to your doctor to make sure that the exercise you do is not making the arthritis worse.   Do not sit for long periods of time. Try to walk once in a while to keep your knee from getting stiff. Ask your doctor or physical therapist whether shoe inserts may reduce your arthritis pain. If you can afford it, get new athletic shoes at least every year. This can help reduce the strain on your knees. Use a device to help you do everyday activities. A cane or walking stick can help you keep your balance when you walk. Hold the cane or walking stick in the hand opposite the painful knee. If you feel like you may fall when you walk, try using crutches or a front-wheeled walker. These can prevent falls that could cause more damage to your knee. A knee brace may help keep your knee stable and prevent pain. You also can use other things to make life easier, such as a higher toilet seat and handrails in the bathtub or shower. Take pain medicines exactly as directed. Do not wait until you are in severe pain. You will get better results if you take it sooner. If you are not taking a prescription pain medicine, take an over-the-counter medicine such as acetaminophen (Tylenol), ibuprofen (Advil, Motrin), or naproxen (Aleve). Read and follow all instructions on the label. Do not take two or more pain medicines at the same time unless the doctor told you to. Many pain medicines have acetaminophen, which is Tylenol. Too much acetaminophen (Tylenol) can be harmful. Tell your doctor if you take a blood thinner, have diabetes, or have allergies to shellfish. Ask your doctor if you might benefit from a shot of steroid medicine into your knee. This may provide pain relief for several months. Many people take the supplements glucosamine and chondroitin for osteoarthritis. Some people feel they help, but the medical research does not show that they work. Talk to your doctor before you take these supplements. When should you call for help?    Call your doctor now or seek immediate medical care if:    You have sudden swelling, warmth, or pain in your knee. You have knee pain and a fever or rash. You have such bad pain that you cannot use your knee. Watch closely for changes in your health, and be sure to contact your doctor if you have any problems. Where can you learn more? Go to http://www.gray.com/  Enter W187 in the search box to learn more about \"Knee Arthritis: Care Instructions. \"  Current as of: December 20, 2021               Content Version: 13.2  © 2006-2022 [employer]. Care instructions adapted under license by [employer] (which disclaims liability or warranty for this information). If you have questions about a medical condition or this instruction, always ask your healthcare professional. [address] any warranty or liability for your use of this information.

## 2022-08-23 NOTE — LETTER
En Rajeshchunsindhuchun   1960   [id number]       8/23/2022       I hereby authorize and direct Antonio Steward MD, Heri Barrientos, and whomever he may designate as his associate to perform upon myself the following procedure:    Injection of: Kenalog, LT KNEE RM 1    If any unforeseen condition arises in the course of the procedure, I further authorize him and his associated and/or assistant(s) to do whatever he/she deems advisable. The nature, purpose, benefits, risks, side effects, likelihood of achieving goals, and potential problems that might occur during recuperation, risks for not receiving the proposed care, treatment and services and alternatives of the procedure have been fully explained to me by my physician including, but not limited to:    Swelling, joint pain, skin pigment changes, worsening of condition, and failure to improve. I acknowledge that no guarantee or assurance has been made to me as to the results that may be obtained or the likelihood of success.                 _______________________________________     Signature of patient or authorized representative                [facility] fax: [phone]

## 2023-01-11 ENCOUNTER — HOSPITAL ENCOUNTER (OUTPATIENT)
Dept: GENERAL RADIOLOGY | Age: 63
Discharge: HOME | End: 2023-01-11
Payer: COMMERCIAL

## 2023-01-11 ENCOUNTER — HOSPITAL ENCOUNTER (OUTPATIENT)
Dept: LAB | Age: 63
Discharge: HOME | End: 2023-01-11
Payer: COMMERCIAL

## 2023-01-11 ENCOUNTER — HOSPITAL ENCOUNTER (OUTPATIENT)
Dept: NON INVASIVE DIAGNOSTICS | Age: 63
Discharge: HOME | End: 2023-01-11
Payer: COMMERCIAL

## 2023-01-11 ENCOUNTER — OFFICE VISIT (OUTPATIENT)
Dept: ORTHOPEDIC SURGERY | Age: 63
End: 2023-01-11
Payer: COMMERCIAL

## 2023-01-11 DIAGNOSIS — M25.562 LEFT KNEE PAIN, UNSPECIFIED CHRONICITY: ICD-10-CM

## 2023-01-11 DIAGNOSIS — M17.12 OSTEOARTHRITIS OF LEFT KNEE, UNSPECIFIED OSTEOARTHRITIS TYPE: ICD-10-CM

## 2023-01-11 DIAGNOSIS — M17.12 OSTEOARTHRITIS OF LEFT KNEE, UNSPECIFIED OSTEOARTHRITIS TYPE: Primary | ICD-10-CM

## 2023-01-11 LAB
ANION GAP SERPL CALC-SCNC: 8 MMOL/L (ref 5–15)
BASOPHILS # BLD: 0.1 K/UL (ref 0–0.1)
BASOPHILS NFR BLD: 1 % (ref 0–1)
BUN SERPL-MCNC: 10 MG/DL (ref 6–20)
BUN/CREAT SERPL: 13 (ref 12–20)
CA-I BLD-MCNC: 9.7 MG/DL (ref 8.5–10.1)
CHLORIDE SERPL-SCNC: 102 MMOL/L (ref 97–108)
CO2 SERPL-SCNC: 29 MMOL/L (ref 21–32)
CREAT SERPL-MCNC: 0.78 MG/DL (ref 0.55–1.02)
DIFFERENTIAL METHOD BLD: ABNORMAL
EOSINOPHIL # BLD: 0.1 K/UL (ref 0–0.4)
EOSINOPHIL NFR BLD: 1 % (ref 0–7)
ERYTHROCYTE [DISTWIDTH] IN BLOOD BY AUTOMATED COUNT: 15.6 % (ref 11.5–14.5)
GLUCOSE SERPL-MCNC: 106 MG/DL (ref 65–100)
HCT VFR BLD AUTO: 32.1 % (ref 35–47)
HGB BLD-MCNC: 10.1 G/DL (ref 11.5–16)
IMM GRANULOCYTES # BLD AUTO: 0 K/UL (ref 0–0.04)
IMM GRANULOCYTES NFR BLD AUTO: 0 % (ref 0–0.5)
LYMPHOCYTES # BLD: 1.4 K/UL (ref 0.8–3.5)
LYMPHOCYTES NFR BLD: 21 % (ref 12–49)
MCH RBC QN AUTO: 27.1 PG (ref 26–34)
MCHC RBC AUTO-ENTMCNC: 31.5 G/DL (ref 30–36.5)
MCV RBC AUTO: 86.1 FL (ref 80–99)
MONOCYTES # BLD: 0.5 K/UL (ref 0–1)
MONOCYTES NFR BLD: 8 % (ref 5–13)
NEUTS SEG # BLD: 4.8 K/UL (ref 1.8–8)
NEUTS SEG NFR BLD: 69 % (ref 32–75)
NRBC # BLD: 0 K/UL (ref 0–0.01)
NRBC BLD-RTO: 0 PER 100 WBC
PLATELET # BLD AUTO: 401 K/UL (ref 150–400)
PMV BLD AUTO: 8.2 FL (ref 8.9–12.9)
POTASSIUM SERPL-SCNC: 3.6 MMOL/L (ref 3.5–5.1)
RBC # BLD AUTO: 3.73 M/UL (ref 3.8–5.2)
SODIUM SERPL-SCNC: 139 MMOL/L (ref 136–145)
WBC # BLD AUTO: 6.8 K/UL (ref 3.6–11)

## 2023-01-11 PROCEDURE — 80048 BASIC METABOLIC PNL TOTAL CA: CPT

## 2023-01-11 PROCEDURE — 93005 ELECTROCARDIOGRAM TRACING: CPT

## 2023-01-11 PROCEDURE — 71046 X-RAY EXAM CHEST 2 VIEWS: CPT

## 2023-01-11 PROCEDURE — 85025 COMPLETE CBC W/AUTO DIFF WBC: CPT

## 2023-01-11 PROCEDURE — 36415 COLL VENOUS BLD VENIPUNCTURE: CPT

## 2023-01-11 RX ORDER — ASPIRIN 325 MG
TABLET, DELAYED RELEASE (ENTERIC COATED) ORAL
COMMUNITY

## 2023-01-11 RX ORDER — FOLIC ACID 1 MG/1
1 TABLET ORAL DAILY
COMMUNITY
Start: 2022-11-15

## 2023-01-11 NOTE — PROGRESS NOTES
Name: Arminda Burnham    : 1960     Service Dept: [address] [place] [address] and Sports Medicine    Chief Complaint   Patient presents with    Knee Pain        There were no vitals taken for this visit. No Known Allergies     Current Outpatient Medications   Medication Sig Dispense Refill    sodium hyaluronate, viscosup, 10 mg/mL(mw 2.4 -3.6 million) syrg 20 mg by Intra artICUlar route. cholecalciferol (VITAMIN D3) (50,000 UNITS /1250 MCG) capsule cholecalciferol (vitamin D3) 1,250 mcg (50,000 unit) capsule   TAKE 1 CAPSULE BY MOUTH EVERY WEEK      folic acid (FOLVITE) 1 mg tablet Take 1 mg by mouth daily. ascorbic acid, vitamin C, (VITAMIN C) 500 mg tablet Take 500 mg by mouth daily. ascorbic acid (VITAMIN C) 500 mg chewable tablet Vitamin C With Mariana Hips 500 mg chewable tablet   1 TABLET,CHEWABLE ORALLY DAILY. clobetasoL (TEMOVATE) 0.05 % topical cream Apply  to affected area two (2) times a day. diclofenac (VOLTAREN) 1 % gel Apply 1 g to affected area every four (4) hours as needed. ibuprofen (MOTRIN) 600 mg tablet Take 600 mg by mouth every eight (8) hours as needed. letrozole (FEMARA) 2.5 mg tablet Take 2.5 mg by mouth nightly. lidocaine, PF, (XYLOCAINE) 10 mg/mL (1 %) injection 10 mg.      meloxicam (MOBIC) 15 mg tablet       methotrexate (RHEUMATREX) 2.5 mg tablet TAKE 6 TABLET BY MOUTH ONCE WEEKLY      triamcinolone acetonide (Kenalog) 40 mg/mL injection 40 mg.      naproxen (NAPROSYN) 500 mg tablet Take 500 mg by mouth two (2) times daily (with meals).         Patient Active Problem List   Diagnosis Code    Abnormal weight loss R63.4    Anemia D64.9    Arthralgia of left knee M25.562    Bone pain M89.8X9    Hypertension I10    Hypertrophy of breast N62    Malignant neoplasm of breast (female), unspecified site C50.919    Multiple pulmonary nodules R91.8    Other dietary vitamin B12 deficiency anemia D51.3    Strain of left knee O54.786D Vitamin D deficiency E55.9      History reviewed. No pertinent family history. Social History     Socioeconomic History    Marital status: SINGLE      History reviewed. No pertinent surgical history. History reviewed. No pertinent past medical history. I have reviewed and agree with [address] [place] [address] Nw and ROS and intake form in chart and the record furthermore I have reviewed prior medical record(s) regarding this patients care during this appointment. Review of Systems:   Patient is a pleasant appearing individual, appropriately dressed, well hydrated, well nourished, who is alert, appropriately oriented for age, and in no acute distress with a normal gait and normal affect who does not appear to be in any significant pain. Physical Exam:  Left Knee -Decrease range of motion with flexion, Knee arc of greater than 50 degrees, Some crepitation, Grossly neurovascularly intact, Good cap refill, No skin lesion, Moderate swelling, some gross instability, Some quadriceps weakness, Kellgren and Stephon at least grade 3    Right Knee - Full Range of Motion, No crepitation, Grossly neurovascularly intact, Good cap refill, No skin lesion, No swelling, No gross instability, No quadriceps weakness     Encounter Diagnoses     ICD-10-CM ICD-9-CM   1. Osteoarthritis of left knee, unspecified osteoarthritis type  M17.12 715.96   2. Left knee pain, unspecified chronicity  M25.562 719.46       HPI:  The patient is here with a chief complaint of left knee pain, throbbing, burning pain, progressively getting worse. Pain is 10/10. X-rays of the left knee are positive for severe OA with little bit of contracture. Assessment/Plan:  Plan at this point, my recommendation will be for left total knee replacement, general medical clearance. We will go and try to bump her up as soon as possible. We will try to do it in the hospital setting.   If she continues to have difficulty, we may consider injection, but we are going to try to bump her surgery up and go from there. As part of continued conservative pain management options the patient was advised to utilize Tylenol or OTC NSAIDS as long as it is not medically contraindicated. Return to Office: Follow-up and Dispositions    Return for [facility] FOR SURGERY. Scribed by Aurora Cohn as dictated by Ciaran Cornejo. Linda Andersen MD.  Documentation, performed by, True and Accepted Antonio Andersen MD

## 2023-01-11 NOTE — PATIENT INSTRUCTIONS

## 2023-01-12 LAB
ATRIAL RATE: 93 BPM
BACTERIA SPEC CULT: ABNORMAL
BACTERIA SPEC CULT: ABNORMAL
CALCULATED P AXIS, ECG09: 34 DEGREES
CALCULATED R AXIS, ECG10: 32 DEGREES
CALCULATED T AXIS, ECG11: 49 DEGREES
DIAGNOSIS, 93000: NORMAL
P-R INTERVAL, ECG05: 141 MS
Q-T INTERVAL, ECG07: 357 MS
QRS DURATION, ECG06: 80 MS
QTC CALCULATION (BEZET), ECG08: 445 MS
SPECIAL REQUESTS,SREQ: ABNORMAL
VENTRICULAR RATE, ECG03: 93 BPM

## 2023-02-06 DIAGNOSIS — Z96.652 STATUS POST TOTAL LEFT KNEE REPLACEMENT: Primary | ICD-10-CM

## 2023-02-09 ENCOUNTER — OFFICE VISIT (OUTPATIENT)
Dept: ORTHOPEDIC SURGERY | Age: 63
End: 2023-02-09
Payer: COMMERCIAL

## 2023-02-09 ENCOUNTER — HOSPITAL ENCOUNTER (OUTPATIENT)
Dept: GENERAL RADIOLOGY | Age: 63
End: 2023-02-09
Attending: ORTHOPAEDIC SURGERY
Payer: COMMERCIAL

## 2023-02-09 ENCOUNTER — HOSPITAL ENCOUNTER (OUTPATIENT)
Dept: PREADMISSION TESTING | Age: 63
Discharge: HOME | End: 2023-02-09
Payer: COMMERCIAL

## 2023-02-09 DIAGNOSIS — M25.562 LEFT KNEE PAIN, UNSPECIFIED CHRONICITY: ICD-10-CM

## 2023-02-09 DIAGNOSIS — M17.12 OSTEOARTHRITIS OF LEFT KNEE, UNSPECIFIED OSTEOARTHRITIS TYPE: Primary | ICD-10-CM

## 2023-02-09 DIAGNOSIS — Z01.811 PRE-OP CHEST EXAM: ICD-10-CM

## 2023-02-09 DIAGNOSIS — Z01.811 PRE-OP CHEST EXAM: Primary | ICD-10-CM

## 2023-02-09 PROCEDURE — 71046 X-RAY EXAM CHEST 2 VIEWS: CPT

## 2023-02-09 RX ORDER — OXYCODONE AND ACETAMINOPHEN 5; 325 MG/1; MG/1
1 TABLET ORAL
Qty: 30 TABLET | Refills: 0 | Status: SHIPPED | OUTPATIENT
Start: 2023-02-09 | End: 2023-02-23

## 2023-02-09 RX ORDER — CEPHALEXIN 500 MG/1
500 CAPSULE ORAL EVERY 8 HOURS
Qty: 9 CAPSULE | Refills: 0 | Status: SHIPPED | OUTPATIENT
Start: 2023-02-09 | End: 2023-02-12

## 2023-02-09 RX ORDER — ONDANSETRON 4 MG/1
4 TABLET, ORALLY DISINTEGRATING ORAL
Qty: 20 TABLET | Refills: 0 | Status: SHIPPED | OUTPATIENT
Start: 2023-02-09

## 2023-02-09 NOTE — PATIENT INSTRUCTIONS

## 2023-02-09 NOTE — PROGRESS NOTES
Name: Gurinder Kerr    : 1960     Service Dept: [facility] Sports Medicine    Chief Complaint   Patient presents with    Pre-op Exam    Knee Pain        There were no vitals taken for this visit. Allergies   Allergen Reactions    Tetracycline Nausea and Vomiting        Current Outpatient Medications   Medication Sig Dispense Refill    oxyCODONE-acetaminophen (Percocet) 5-325 mg per tablet Take 1 Tablet by mouth every four to six (4-6) hours as needed for Pain for up to 14 days. Max Daily Amount: 6 Tablets. 30 Tablet 0    ondansetron (ZOFRAN ODT) 4 mg disintegrating tablet Take 1 Tablet by mouth every eight (8) hours as needed for Nausea, Vomiting or Nausea or Vomiting for up to 20 doses. 20 Tablet 0    cephALEXin (Keflex) 500 mg capsule Take 1 Capsule by mouth every eight (8) hours for 3 days. Start antibiotics after surgery 9 Capsule 0    hydroCHLOROthiazide (MICROZIDE) 12.5 mg capsule Take 12.5 mg by mouth daily. cholecalciferol (VITAMIN D3) (50,000 UNITS /1250 MCG) capsule cholecalciferol (vitamin D3) 1,250 mcg (50,000 unit) capsule   TAKE 1 CAPSULE BY MOUTH EVERY WEEK      folic acid (FOLVITE) 1 mg tablet Take 1 mg by mouth daily. ascorbic acid (VITAMIN C) 500 mg chewable tablet Vitamin C With Mariana Hips 500 mg chewable tablet   1 TABLET,CHEWABLE ORALLY DAILY. diclofenac (VOLTAREN) 1 % gel Apply 1 g to affected area every four (4) hours as needed. ibuprofen (MOTRIN) 600 mg tablet Take 600 mg by mouth every eight (8) hours as needed.       meloxicam (MOBIC) 15 mg tablet       methotrexate (RHEUMATREX) 2.5 mg tablet TAKE 6 TABLET BY MOUTH ONCE WEEKLY        Patient Active Problem List   Diagnosis Code    Abnormal weight loss R63.4    Anemia D64.9    Arthralgia of left knee M25.562    Bone pain M89.8X9    Hypertension I10    Hypertrophy of breast N62    Malignant neoplasm of breast (female), unspecified site C50.919    Multiple pulmonary nodules R91.8    Other dietary vitamin B12 deficiency anemia D51.3    Strain of left knee X26.235E    Vitamin D deficiency E55.9      History reviewed. No pertinent family history. Social History     Socioeconomic History    Marital status: SINGLE   Tobacco Use    Smoking status: Never    Smokeless tobacco: Never      Past Surgical History:   Procedure Laterality Date    HX KNEE REPLACEMENT Right     [place] UNLISTED PROCEDURE BREAST      rt breast implant      Past Medical History:   Diagnosis Date    Breast CA ([address]     right side    Hypertension         I have reviewed and agree with [address] [place] [address] Nw and ROS and intake form in chart and the record furthermore I have reviewed prior medical record(s) regarding this patients care during this appointment. Review of Systems:   Patient is a pleasant appearing individual, appropriately dressed, well hydrated, well nourished, who is alert, appropriately oriented for age, and in no acute distress with a normal gait and normal affect who does not appear to be in any significant pain. Physical Exam:  Left Knee -Decrease range of motion with flexion, Knee arc of greater than 50 degrees, Some crepitation, Grossly neurovascularly intact, Good cap refill, No skin lesion, Moderate swelling, some gross instability, Some quadriceps weakness, Kellgren and Stephon at least grade 3    Right Knee - Full Range of Motion, No crepitation, Grossly neurovascularly intact, Good cap refill, No skin lesion, No swelling, No gross instability, No quadriceps weakness    Inpatient status: The patient has admitted to severe pain in the affected knee and due to such pain they are unable to complete activities of daily living at home and/or work on a regular basis where conservative treatments have failed. After extensive discussion with the patient, they have chosen to receive a total knee replacement with the expectation of inpatient procedure.  Their dependent functional status (i.e. lack of capable support and safety at home, pain management, comorbities, or difficulty ambulating with assistive walking devices) would deem them a candidate for an inpatient stay. The patient acknowledges and understand the plan. The risks of surgery were explained to the patient which include but not limited to infection, nerve injury, artery injury, tendon injury, poor result, poor wound healing, unforeseen incidence, bleeding, infection, nerve damage, failure to improve, worsening of symptoms, morbidity, and mortality risks were explained. All questions were answered. Patient was told of no guarantees. Patient accepts all risks and benefits. A consent for surgery will be documented and signed by the patient or a legal guardian. All questions were answered. The procedure was explained in detail. The patient was counseled about the risks of rosa Covid-19 during their perioperative period and any recovery window from their procedure. The patient was made aware that rosa Covid-19 may worsen their prognosis for recovering from their procedure and lend to a higher morbidity and/or mortality risk. All material risks, benefits, and reasonable alternatives including postponing the procedure were discussed. The patient DOES wish to proceed with their procedure at this time. Encounter Diagnoses     ICD-10-CM ICD-9-CM   1. Osteoarthritis of left knee, unspecified osteoarthritis type  M17.12 715.96   2. Left knee pain, unspecified chronicity  M25.562 719.46       HPI:  The patient is here with a chief complaint of left knee pain, throbbing, burning pain, diagnosed with osteoarthritis. Pain is 7/10. Continues to have difficulty with it. Assessment/Plan:  Plan will be for left total knee replacement on 02/17/2023. No history of blood clots. Does not take any blood thinners. No complication history and we will do Percocet, Keflex, Zofran and aspirin for DVT prophylaxis.     As part of continued conservative pain management options the patient was advised to utilize Tylenol or OTC NSAIDS as long as it is not medically contraindicated. Return to Office: Follow-up and Dispositions    Return for already briana for surgery. Scribed by Eron Duran as dictated by Pita Barker MD.  Documentation, performed by, True and Accepted Antonio Barker MD

## 2023-02-11 NOTE — H&P (VIEW-ONLY)
Progress Notes by Landen Ayala MD at 23 1500                Author: Landen Ayala MD  Service: --  Author Type: Physician       Filed: 02/10/23 1216  Encounter Date: 2023  Status: Signed          : Landen Ayala MD (Physician)               Name: Jeremiah Dotson     : 1960       Service Dept: [address] [place] [address] Sports [facility]        Chief Complaint       Patient presents with        ? Pre-op Exam        ?  Knee Pain            There were no vitals taken for this visit. Allergies        Allergen  Reactions         ? Tetracycline  Nausea and Vomiting              Current Outpatient Medications          Medication  Sig  Dispense  Refill           ?  oxyCODONE-acetaminophen (Percocet) 5-325 mg per tablet  Take 1 Tablet by mouth every four to six (4-6) hours as needed for Pain for up to 14 days. Max Daily Amount: 6 Tablets. 30 Tablet  0     ?  ondansetron (ZOFRAN ODT) 4 mg disintegrating tablet  Take 1 Tablet by mouth every eight (8) hours as needed for Nausea, Vomiting or Nausea or Vomiting for up to 20 doses. 20 Tablet  0     ?  cephALEXin (Keflex) 500 mg capsule  Take 1 Capsule by mouth every eight (8) hours for 3 days. Start antibiotics after surgery  9 Capsule  0     ?  hydroCHLOROthiazide (MICROZIDE) 12.5 mg capsule  Take 12.5 mg by mouth daily. ?  cholecalciferol (VITAMIN D3) (50,000 UNITS /1250 MCG) capsule  cholecalciferol (vitamin D3) 1,250 mcg (50,000 unit) capsule    TAKE 1 CAPSULE BY MOUTH EVERY WEEK         ?  folic acid (FOLVITE) 1 mg tablet  Take 1 mg by mouth daily. ? ascorbic acid (VITAMIN C) 500 mg chewable tablet  Vitamin C With Skylar Hips 500 mg chewable tablet    1 TABLET,CHEWABLE ORALLY DAILY. ? diclofenac (VOLTAREN) 1 % gel  Apply 1 g to affected area every four (4) hours as needed. ?   ibuprofen (MOTRIN) 600 mg tablet  Take 600 mg by mouth every eight (8) hours as needed. ?  meloxicam (MOBIC) 15 mg tablet                 ? methotrexate (RHEUMATREX) 2.5 mg tablet  TAKE 6 TABLET BY MOUTH ONCE WEEKLY               Patient Active Problem List        Diagnosis  Code         ? Abnormal weight loss  R63.4     ? Anemia  D64.9     ? Arthralgia of left knee  M25.562     ? Bone pain  M89.8X9     ? Hypertension  I10     ? Hypertrophy of breast  N62     ? Malignant neoplasm of breast (female), unspecified site  C50.919     ? Multiple pulmonary nodules  R91.8     ? Other dietary vitamin B12 deficiency anemia  D51.3     ? Strain of left knee  T44.578C         ? Vitamin D deficiency  E55.9         History reviewed. No pertinent family history. Social History          Socioeconomic History         ? Marital status:  SINGLE       Tobacco Use         ? Smoking status:  Never         ? Smokeless tobacco:  Never           Past Surgical History:         Procedure  Laterality  Date          ? HX KNEE REPLACEMENT  Right       ? [place] UNLISTED PROCEDURE BREAST              rt breast implant           Past Medical History:        Diagnosis  Date         ? Breast CA ([facility] [address]            right side         ? Hypertension              I have reviewed and agree with [address] [place] [address] Nw and ROS and intake form in chart and the record furthermore I have reviewed prior medical record(s) regarding this patients care during this appointment. Review of Systems:    Patient is a pleasant appearing individual, appropriately dressed, well hydrated, well nourished, who is alert, appropriately oriented for age, and in no acute distress with a normal gait and normal  affect who does not appear to be in any significant pain.        Physical Exam:   Left Knee -Decrease range of motion with flexion, Knee arc of greater than 50 degrees, Some crepitation, Grossly neurovascularly intact, Good cap refill, No skin lesion, Moderate swelling, some gross  instability, Some quadriceps weakness, Kellgren and Param at least grade 3      Right Knee - Full Range of Motion, No crepitation, Grossly neurovascularly intact, Good cap refill, No skin lesion, No swelling, No gross instability, No quadriceps weakness      Inpatient status: The patient has admitted to severe pain in the affected knee and due to such pain they are unable to complete activities of daily living at home and/or work on a regular basis where conservative treatments have failed. After extensive  discussion with the patient, they have chosen to receive a total knee replacement with the expectation of inpatient procedure. Their dependent functional status (i.e. lack of capable support and safety at home, pain management, comorbities, or difficulty  ambulating with assistive walking devices) would deem them a candidate for an inpatient stay. The patient acknowledges and understand the plan. The risks of surgery were explained to the patient which include but not limited to infection, nerve injury, artery injury, tendon injury, poor result, poor wound healing, unforeseen incidence, bleeding, infection, nerve damage, failure to improve, worsening  of symptoms, morbidity, and mortality risks were explained. All questions were answered. Patient was told of no guarantees. Patient accepts all risks and benefits. A consent for surgery will be documented and signed by the patient or a legal guardian. All questions were answered. The procedure was explained in detail. The patient was counseled about the risks of anthony Covid-19 during their perioperative period and any recovery window from their procedure. The patient was made aware that anthony Covid-19 may worsen their prognosis for recovering from their  procedure and lend to a higher morbidity and/or mortality risk. All material risks, benefits, and reasonable alternatives including postponing the procedure were discussed.  The patient DOES wish to proceed with their procedure at this time.         Encounter Diagnoses              ICD-10-CM  ICD-9-CM          1. Osteoarthritis of left knee, unspecified osteoarthritis type   M17.12  715.96          2. Left knee pain, unspecified chronicity   M25.562  719.46            HPI:   The patient is here with a chief complaint of left knee pain, throbbing, burning pain, diagnosed with osteoarthritis. Pain is 7/10. Continues to have difficulty with it. Assessment/Plan:   Plan will be for left total knee replacement on 02/17/2023. No history of blood clots. Does not take any blood thinners. No complication history and we will do Percocet, Keflex, Zofran and aspirin for DVT prophylaxis. As part of continued conservative pain management options the patient was advised to utilize Tylenol or OTC NSAIDS as long as it is not medically contraindicated. Return to Office: Follow-up and Dispositions      ·  Return for already aryan for surgery. Scribed by Sin Wright as dictated by Isidoro Cobian. Riley Fraser MD.   Documentation, performed by, True and Accepted Qasim Fraser MD

## 2023-02-15 ENCOUNTER — ANESTHESIA EVENT (OUTPATIENT)
Age: 63
End: 2023-02-15
Payer: COMMERCIAL

## 2023-02-17 ENCOUNTER — ANESTHESIA (OUTPATIENT)
Age: 63
End: 2023-02-17
Payer: COMMERCIAL

## 2023-02-17 ENCOUNTER — APPOINTMENT (OUTPATIENT)
Age: 63
End: 2023-02-17
Attending: ORTHOPAEDIC SURGERY
Payer: COMMERCIAL

## 2023-02-17 ENCOUNTER — HOSPITAL ENCOUNTER (OUTPATIENT)
Age: 63
Setting detail: OUTPATIENT SURGERY
Discharge: HOME | End: 2023-02-17
Attending: ORTHOPAEDIC SURGERY | Admitting: ORTHOPAEDIC SURGERY
Payer: COMMERCIAL

## 2023-02-17 VITALS
TEMPERATURE: 98 F | WEIGHT: 154.3 LBS | SYSTOLIC BLOOD PRESSURE: 110 MMHG | OXYGEN SATURATION: 100 % | DIASTOLIC BLOOD PRESSURE: 70 MMHG | RESPIRATION RATE: 18 BRPM | HEIGHT: 67 IN | HEART RATE: 70 BPM | BODY MASS INDEX: 24.22 KG/M2

## 2023-02-17 PROCEDURE — C1713 ANCHOR/SCREW BN/BN,TIS/BN: HCPCS | Performed by: ORTHOPAEDIC SURGERY

## 2023-02-17 PROCEDURE — 7100000011 HC PHASE II RECOVERY - ADDTL 15 MIN: Performed by: ORTHOPAEDIC SURGERY

## 2023-02-17 PROCEDURE — 3700000001 HC ADD 15 MINUTES (ANESTHESIA): Performed by: ORTHOPAEDIC SURGERY

## 2023-02-17 PROCEDURE — 73560 X-RAY EXAM OF KNEE 1 OR 2: CPT

## 2023-02-17 PROCEDURE — 2500000003 HC RX 250 WO HCPCS: Performed by: NURSE ANESTHETIST, CERTIFIED REGISTERED

## 2023-02-17 PROCEDURE — 97162 PT EVAL MOD COMPLEX 30 MIN: CPT

## 2023-02-17 PROCEDURE — 3700000000 HC ANESTHESIA ATTENDED CARE: Performed by: ORTHOPAEDIC SURGERY

## 2023-02-17 PROCEDURE — 3600000015 HC SURGERY LEVEL 5 ADDTL 15MIN: Performed by: ORTHOPAEDIC SURGERY

## 2023-02-17 PROCEDURE — 76942 ECHO GUIDE FOR BIOPSY: CPT | Performed by: NURSE ANESTHETIST, CERTIFIED REGISTERED

## 2023-02-17 PROCEDURE — 6360000002 HC RX W HCPCS: Performed by: NURSE PRACTITIONER

## 2023-02-17 PROCEDURE — 2580000003 HC RX 258: Performed by: ORTHOPAEDIC SURGERY

## 2023-02-17 PROCEDURE — 7100000001 HC PACU RECOVERY - ADDTL 15 MIN: Performed by: ORTHOPAEDIC SURGERY

## 2023-02-17 PROCEDURE — 6370000000 HC RX 637 (ALT 250 FOR IP): Performed by: NURSE ANESTHETIST, CERTIFIED REGISTERED

## 2023-02-17 PROCEDURE — 7100000010 HC PHASE II RECOVERY - FIRST 15 MIN: Performed by: ORTHOPAEDIC SURGERY

## 2023-02-17 PROCEDURE — 2720000010 HC SURG SUPPLY STERILE: Performed by: ORTHOPAEDIC SURGERY

## 2023-02-17 PROCEDURE — 2580000003 HC RX 258: Performed by: NURSE ANESTHETIST, CERTIFIED REGISTERED

## 2023-02-17 PROCEDURE — 2580000003 HC RX 258: Performed by: NURSE PRACTITIONER

## 2023-02-17 PROCEDURE — 7100000000 HC PACU RECOVERY - FIRST 15 MIN: Performed by: ORTHOPAEDIC SURGERY

## 2023-02-17 PROCEDURE — 2709999900 HC NON-CHARGEABLE SUPPLY: Performed by: ORTHOPAEDIC SURGERY

## 2023-02-17 PROCEDURE — 2500000003 HC RX 250 WO HCPCS: Performed by: ORTHOPAEDIC SURGERY

## 2023-02-17 PROCEDURE — 3600000005 HC SURGERY LEVEL 5 BASE: Performed by: ORTHOPAEDIC SURGERY

## 2023-02-17 PROCEDURE — 6360000002 HC RX W HCPCS: Performed by: NURSE ANESTHETIST, CERTIFIED REGISTERED

## 2023-02-17 PROCEDURE — C1776 JOINT DEVICE (IMPLANTABLE): HCPCS | Performed by: ORTHOPAEDIC SURGERY

## 2023-02-17 DEVICE — KNEE K1 TOT HEMI STD CEM IMPL CAPPED SYNTHES: Type: IMPLANTABLE DEVICE | Site: KNEE | Status: FUNCTIONAL

## 2023-02-17 DEVICE — ATTUNE PATELLA MEDIALIZED DOME 38MM CEMENTED AOX
Type: IMPLANTABLE DEVICE | Site: KNEE | Status: FUNCTIONAL
Brand: ATTUNE

## 2023-02-17 DEVICE — CEMENT BONE 40 GM W/ GENTMYCN HI VISC PALACOS R+G: Type: IMPLANTABLE DEVICE | Site: KNEE | Status: FUNCTIONAL

## 2023-02-17 DEVICE — ATTUNE KNEE SYSTEM FEMORAL POSTERIOR STABILIZED NARROW SIZE 6N LEFT CEMENTED
Type: IMPLANTABLE DEVICE | Site: KNEE | Status: FUNCTIONAL
Brand: ATTUNE

## 2023-02-17 DEVICE — ATTUNE KNEE SYSTEM TIBIAL INSERT ROTATING PLATFORM POSTERIOR STABILIZED 6 5MM AOX
Type: IMPLANTABLE DEVICE | Site: KNEE | Status: FUNCTIONAL
Brand: ATTUNE

## 2023-02-17 DEVICE — ATTUNE KNEE SYSTEM TIBIAL BASE ROTATING PLATFORM SIZE 6 CEMENTED
Type: IMPLANTABLE DEVICE | Site: KNEE | Status: FUNCTIONAL
Brand: ATTUNE

## 2023-02-17 RX ORDER — SODIUM CHLORIDE 0.9 % (FLUSH) 0.9 %
5-40 SYRINGE (ML) INJECTION PRN
Status: DISCONTINUED | OUTPATIENT
Start: 2023-02-17 | End: 2023-02-17 | Stop reason: HOSPADM

## 2023-02-17 RX ORDER — BUPIVACAINE HYDROCHLORIDE 5 MG/ML
INJECTION, SOLUTION EPIDURAL; INTRACAUDAL
Status: COMPLETED | OUTPATIENT
Start: 2023-02-17 | End: 2023-02-17

## 2023-02-17 RX ORDER — DIPHENHYDRAMINE HCL 25 MG
25 TABLET ORAL EVERY 6 HOURS PRN
Status: CANCELLED | OUTPATIENT
Start: 2023-02-17

## 2023-02-17 RX ORDER — SODIUM CHLORIDE, SODIUM LACTATE, POTASSIUM CHLORIDE, CALCIUM CHLORIDE 600; 310; 30; 20 MG/100ML; MG/100ML; MG/100ML; MG/100ML
INJECTION, SOLUTION INTRAVENOUS CONTINUOUS
Status: DISCONTINUED | OUTPATIENT
Start: 2023-02-17 | End: 2023-02-17 | Stop reason: HOSPADM

## 2023-02-17 RX ORDER — OXYCODONE HYDROCHLORIDE 10 MG/1
10 TABLET ORAL EVERY 4 HOURS PRN
Status: DISCONTINUED | OUTPATIENT
Start: 2023-02-17 | End: 2023-02-17 | Stop reason: HOSPADM

## 2023-02-17 RX ORDER — SODIUM CHLORIDE 0.9 % (FLUSH) 0.9 %
5-40 SYRINGE (ML) INJECTION EVERY 12 HOURS SCHEDULED
Status: DISCONTINUED | OUTPATIENT
Start: 2023-02-17 | End: 2023-02-17 | Stop reason: HOSPADM

## 2023-02-17 RX ORDER — DIPHENHYDRAMINE HYDROCHLORIDE 50 MG/ML
25 INJECTION INTRAMUSCULAR; INTRAVENOUS EVERY 6 HOURS PRN
Status: CANCELLED | OUTPATIENT
Start: 2023-02-17

## 2023-02-17 RX ORDER — GABAPENTIN 300 MG/1
300 CAPSULE ORAL ONCE
Status: COMPLETED | OUTPATIENT
Start: 2023-02-17 | End: 2023-02-17

## 2023-02-17 RX ORDER — OXYCODONE HYDROCHLORIDE 5 MG/1
5 TABLET ORAL EVERY 4 HOURS PRN
Status: DISCONTINUED | OUTPATIENT
Start: 2023-02-17 | End: 2023-02-17 | Stop reason: HOSPADM

## 2023-02-17 RX ORDER — ONDANSETRON 2 MG/ML
4 INJECTION INTRAMUSCULAR; INTRAVENOUS EVERY 6 HOURS PRN
Status: DISCONTINUED | OUTPATIENT
Start: 2023-02-17 | End: 2023-02-17 | Stop reason: HOSPADM

## 2023-02-17 RX ORDER — MIDAZOLAM HYDROCHLORIDE 1 MG/ML
INJECTION INTRAMUSCULAR; INTRAVENOUS PRN
Status: DISCONTINUED | OUTPATIENT
Start: 2023-02-17 | End: 2023-02-17 | Stop reason: SDUPTHER

## 2023-02-17 RX ORDER — ONDANSETRON 4 MG/1
4 TABLET, ORALLY DISINTEGRATING ORAL EVERY 8 HOURS PRN
Status: DISCONTINUED | OUTPATIENT
Start: 2023-02-17 | End: 2023-02-17 | Stop reason: HOSPADM

## 2023-02-17 RX ORDER — FENTANYL CITRATE 50 UG/ML
50 INJECTION, SOLUTION INTRAMUSCULAR; INTRAVENOUS EVERY 5 MIN PRN
Status: DISCONTINUED | OUTPATIENT
Start: 2023-02-17 | End: 2023-02-17 | Stop reason: HOSPADM

## 2023-02-17 RX ORDER — ONDANSETRON 2 MG/ML
4 INJECTION INTRAMUSCULAR; INTRAVENOUS
Status: DISCONTINUED | OUTPATIENT
Start: 2023-02-17 | End: 2023-02-17 | Stop reason: HOSPADM

## 2023-02-17 RX ORDER — ACETAMINOPHEN 500 MG
1000 TABLET ORAL ONCE
Status: COMPLETED | OUTPATIENT
Start: 2023-02-17 | End: 2023-02-17

## 2023-02-17 RX ORDER — BUPIVACAINE HYDROCHLORIDE 7.5 MG/ML
INJECTION, SOLUTION EPIDURAL; RETROBULBAR PRN
Status: DISCONTINUED | OUTPATIENT
Start: 2023-02-17 | End: 2023-02-17 | Stop reason: SDUPTHER

## 2023-02-17 RX ORDER — BUPIVACAINE HYDROCHLORIDE AND EPINEPHRINE 2.5; 5 MG/ML; UG/ML
INJECTION, SOLUTION INFILTRATION; PERINEURAL PRN
Status: DISCONTINUED | OUTPATIENT
Start: 2023-02-17 | End: 2023-02-17 | Stop reason: ALTCHOICE

## 2023-02-17 RX ORDER — KETOROLAC TROMETHAMINE 30 MG/ML
30 INJECTION, SOLUTION INTRAMUSCULAR; INTRAVENOUS EVERY 6 HOURS PRN
Status: CANCELLED | OUTPATIENT
Start: 2023-02-17 | End: 2023-02-17

## 2023-02-17 RX ORDER — ACETAMINOPHEN 325 MG/1
650 TABLET ORAL EVERY 6 HOURS
Status: CANCELLED | OUTPATIENT
Start: 2023-02-17

## 2023-02-17 RX ADMIN — GABAPENTIN 300 MG: 300 CAPSULE ORAL at 07:39

## 2023-02-17 RX ADMIN — ACETAMINOPHEN 1000 MG: 500 TABLET ORAL at 07:39

## 2023-02-17 RX ADMIN — BUPIVACAINE HYDROCHLORIDE 20 ML: 5 INJECTION, SOLUTION EPIDURAL; INTRACAUDAL; PERINEURAL at 08:32

## 2023-02-17 RX ADMIN — TRANEXAMIC ACID 1 G: 1 INJECTION, SOLUTION INTRAVENOUS at 09:55

## 2023-02-17 RX ADMIN — CEFAZOLIN 2000 MG: 2 INJECTION, POWDER, FOR SOLUTION INTRAMUSCULAR; INTRAVENOUS at 09:13

## 2023-02-17 RX ADMIN — MIDAZOLAM HYDROCHLORIDE 4 MG: 2 INJECTION, SOLUTION INTRAMUSCULAR; INTRAVENOUS at 08:27

## 2023-02-17 RX ADMIN — BUPIVACAINE HYDROCHLORIDE 1.6 ML: 7.5 INJECTION, SOLUTION EPIDURAL; RETROBULBAR at 09:01

## 2023-02-17 RX ADMIN — TRANEXAMIC ACID 1 G: 1 INJECTION, SOLUTION INTRAVENOUS at 09:08

## 2023-02-17 RX ADMIN — SODIUM CHLORIDE, POTASSIUM CHLORIDE, SODIUM LACTATE AND CALCIUM CHLORIDE: 600; 310; 30; 20 INJECTION, SOLUTION INTRAVENOUS at 07:39

## 2023-02-17 ASSESSMENT — PAIN - FUNCTIONAL ASSESSMENT: PAIN_FUNCTIONAL_ASSESSMENT: NONE - DENIES PAIN

## 2023-02-17 NOTE — ANESTHESIA PROCEDURE NOTES
Peripheral Block    Patient location during procedure: pre-op  Reason for block: post-op pain management  Start time: 2/17/2023 8:27 AM  End time: 2/17/2023 8:32 AM  Staffing  Performed: resident/CRNA   Preanesthetic Checklist  Completed: patient identified, IV checked, site marked, risks and benefits discussed, surgical/procedural consents, equipment checked, pre-op evaluation, timeout performed, anesthesia consent given, oxygen available, monitors applied/VS acknowledged, fire risk safety assessment completed and verbalized and blood product R/B/A discussed and consented  Peripheral Block   Patient position: supine  Prep: ChloraPrep  Provider prep: mask  Patient monitoring: continuous pulse ox, frequent blood pressure checks, IV access, oxygen and responsive to questions  Block type: Saphenous  Laterality: left  Injection technique: single-shot  Guidance: ultrasound guided    Needle   Needle type: short-bevel   Needle gauge: 20 G  Assessment   Injection assessment: negative aspiration for heme, no intravascular symptoms, local visualized surrounding nerve on ultrasound and no paresthesia on injection  Hemodynamics: stable  Real-time US image taken/store: yes  Outcomes: uncomplicated and patient tolerated procedure well    Medications Administered  bupivacaine (PF) 0.5 % - Perineural   20 mL - 2/17/2023 8:32:00 AM  dexamethasone 4 MG/ML - Perineural   4 mg - 2/17/2023 8:32:00 AM

## 2023-02-17 NOTE — DISCHARGE INSTRUCTIONS
TOTAL KNEE REPLACEMENT DISCHARGE INFORMATION    You have undergone a Total Knee Replacement. The following list is to provide you with some expectations over the next week upon your discharge from the hospital.     Please begin Aspirin 325mg every 12 hours (twice daily) starting tomorrow as directed until Dr. Todd Antonio instructs you to discontinue it. If you are not sure which blood thinner to take please contact Dr. Kallie Varghese office next business day for clarification. Please be sure to continue your thigh-high compression stockings on both sides until instructed to discontinue them. Over the course of the next week, you should continue thigh high stockings on the operative leg, DO NOT GET THE INCISION WET until instructed to do so. Please make sure the stockings on the operative leg are pulled up all the way to the thigh to prevent any creases which may result in abrasions or creases in the skin. If the stockings are creating creases resulting in abrasions or blistering on the operative leg please remove the stockings. You may take the stockings off on the nonoperative leg once you arrive home. You may notice some bruising on your thigh and it may extend all the way to the ankles. That is perfectly normal early on. You may experience a clicking noise in your knee and that is normal because of the artificial knee. It is important to remember if you have any surgical procedure including dental procedures which may result in bleeding that an antibiotic 1 hour before the procedure will be required. Please let the provider performing the procedure know that you have artificial joint. If an antibiotic is not given by them please call our office and give us at least 5 business days to get you the appropriate antibiotics if needed. This rule applies indefinitely. If an Ace wrap is placed on your knee you may remove the Ace wrap only 48 hours after your surgery. We will leave the stockings on.   During the course of your  over the next week, should you experience fevers of 101.5 F, a white drainage from the incision, extreme redness around the incision, or the incision begins to have a pungent smell; Please call our office or page Dr. Chay Lentz whose numbers are provided in your discharge paperwork. To Page Dr. Chay Lentz please call [phone] and dial [phone]. Have the  page whomever is on call for Orthopedics. These are signs of infection and it should be addressed immediately. Please do not drive until instructed to do so. If you need a refill on pain medication please allow at least 2 business days notice for any refills. Immediate refill request may not be possible. Medication refill requests will not be addressed during non-business hours. Please do not page the on-call provider for pain medication refills after hours. It is very important for you to begin your Outpatient Physical Therapy within a couple days of the day of your discharge and your appointment should have been set up. If your physical therapy has not been set up please call our office the next business day for assistance. Details provided in a separate sheet. Remove ace wrap in 48 hrs after surgery but keep stockings on. You may remove the stocking and keep the stocking off on the nonoperative leg. Finish all antibiotics, start the antibiotics as soon as you go home if you have prescribed antibiotics. 14.  You should perform your daily home exercises at least 4 times a day 30 minutes each time. Perform foot pumps on both feet at least 10 times every 15 minutes while awake. This helps prevent swelling in the leg and can help prevent blood clots in the leg. On the operative leg if you have significant swelling you can also lay down flat and put 3 pillows under the heel so the heel is above the heart level and then perform foot pumps 4 times a day for 10 minutes to help bring the swelling down.   15.  Do not place anything under your knee while sleeping at night. Elevate your heel so your  is straight while sleeping at night. 16.  Perform deep breathing exercises 10 times every hour while awake. 17.  If you had a nerve block and you are not having pain the day of the surgery, at nighttime it is okay to take 1 pain medication before going to sleep to help prevent excruciating pain when the nerve block wears off. 18.  You may be given an ice pack machine use that to help prevent swelling. Do not apply heat to the incision area. 19.  While you are awake at least 10 times every 30 minutes move your foot up and down as if you are pumping gas from both feet to help prevent swelling and to promote blood circulation in the calf. 20.  If you develop sudden onset of shortness of breath or severe calf pain please go to closest emergency room. 21. Your pain medicine is a Narcotic and may cause constipation. You may take an over the counter stool softener while taking pain medicine. 25.  You will get surveys either via text message or email after your surgery on a periodic basis. Please participate in the surveys as it helps to track your progress. ICE THERAPY WRAP:    Keep ice therapy wrap on when resting. DO not wear when moving or walking. Ice packs are reusable. Ice Therapy wrap holds two ice packs at a time. Things to watch for:             Increased swelling of the surgical site             Spreading of redness around the incision site             Drainage of pus from the incision site             Developing a fever of 101.5 °F or higher             If any of these symptoms occur you have any questions please contact our office at [phone]. If you need to talk to Dr. Alina Blanton or his staff after hours please call the office and have the on-call service get in touch with the provider on call that day. Please note pain medications are not refilled after hours or on weekends.   If Dr. Alina Blanton or his staff do not call you back within 30 minutes.  Please tell the  to try again.    Phone: [phone]  [email or web address]

## 2023-02-17 NOTE — PERIOP NOTE
Patient returns to room ambulating with walker. VSS. Denies pain. Patient was unable to void, will straight cath and discharge home. No distress noted.

## 2023-02-17 NOTE — INTERVAL H&P NOTE
Update History & Physical    The Patient's History and Physical was reviewed with the patient. The patient was examined. There was no change. The surgical site was confirmed by the patient and me. Patient understands and wants to proceed with the procedure. If applicable, I have discussed with the patient / power of  the rationale for blood component transfusion; its benefits in treating or preventing fatigue, organ damage, or death; and its risk which includes mild transfusion reactions, rare risk of blood borne infection, or more serious but rare reactions. I have discussed the alternatives to transfusion, including the risk and consequences of not receiving transfusion. The patient / [facility]  had an opportunity to ask questions and had agreed to proceed with transfusion of blood components. Plan:  The risk, benefits, expected outcome, and alternative to the recommended procedure have been discussed with the patient.

## 2023-02-17 NOTE — PERIOP NOTE
Physical therapy in room with patient. Patient assisted out of bed, gait belt in place. Ambulatory with walker and physical therapist. No distress noted.

## 2023-02-17 NOTE — ANESTHESIA PROCEDURE NOTES
Spinal Block    End time: 2/17/2023 9:01 AM  Reason for block: primary anesthetic  Staffing  Performed: resident/CRNA   Resident/CRNA: ESTHER Sanders - CRNA  Spinal Block  Patient position: sitting  Prep: Betasept  Patient monitoring: cardiac monitor, continuous pulse ox, frequent blood pressure checks and oxygen  Approach: midline  Location: L3/L4  Provider prep: mask and sterile gloves  Needle  Needle type:  Ceci   Needle gauge: 25 G  Assessment  Swirl obtained: Yes  CSF: clear  Attempts: 1  Hemodynamics: stable

## 2023-02-17 NOTE — OP NOTE
Operative Note    Patient: Aiden Wade MRN: [id number]  Surgery Date: [unfilled]  [unfilled]          Procedure  Primary Surgeon    LEFT TKA  Hilda Jimenez MD    * Panel 2 does not exist *  * Panel 2 does not exist *    * Panel 3 does not exist *  * Panel 3 does not exist *     Surgeon(s) and Role:     * Hilda Jimenez MD - Primary    Other OR Staff/Assistants:  Circulator: Cleo Mcrae RN  Surgical Assistant: Rosana Bernardo [employer] First: Katrina Moreno; Isa Salazar  Perioperative Nurse: Tanvir Lopez RN    1st Assistant Tasks:  Closing    Pre-operative Diagnosis:  Primary osteoarthritis of left knee [M17.12]    Post-operative Diagnosis: same as preop diagnosis    Anesthesia Type: Spinal     Findings: djd    Complications: No    EBL: 50 cc    Specimens: None    Implants       Type Not Specified    Component Pat [id number] Polyeth Dome Jossue Medialized Attune - [id number] - Implanted   (Left) Knee      Inventory item: COMPONENT PAT [id number] POLYETH DOME JOSSUE MEDIALIZED ATTUNE Model/Cat number: [id number]    : [employer] Lot number: [id number]    Device identifier: [id number] Device identifier type: GS1      GUDID Information       Request status Waiting for response                As of 2/17/2023       Status: Implanted                      Insert Tib Sz 6 Thk5mm Knee Post Stbl Rot Platfrm Attune - [id number] - Implanted   (Left) Knee      Inventory item: INSERT TIB SZ 6 THK5MM KNEE POST STBL ROT PLATFRM ATTUNE Model/Cat number: [id number]    : [employer] Lot number: [id number]    Device identifier: [id number] Device identifier type: GS1      GUDID Information       Request status Waiting for response                As of 2/17/2023       Status: Implanted                      Component Fem Sz 6 L Knee Marlo Post Stbl Jossue Attune - [id number] - Implanted   (Left) Knee      Inventory item: COMPONENT FEM SZ 6 L KNEE MARLO POST STBL JOSSUE [employer] Model/Cat number: [id number]    : [employer] Lot number: [id number]    Device identifier: [id number] Device identifier type: GS1      GUDID Information       Request status Waiting for response                As of 2/17/2023       Status: Implanted                      Component Tib Sz 6 Ghulam Base Rot Platfrm Knee Sys Attune - [id number] - Implanted   (Left) Knee      Inventory item: COMPONENT TIB SZ 6 GHULAM BASE ROT PLATFRM KNEE SYS ATTDEVYN Model/Cat number: [id number]    : [employer] Lot number: [id number]    Device identifier: [id number] Device identifier type: GS1      GUDID Information       Request status Waiting for response                As of 2/17/2023       Status: Implanted                      Cement Bone 40 Gm W/ Gentmycn Hi Visc Palacos R+G - [id number] - Implanted   (Left) Knee      Inventory item: CEMENT BONE 40 GM W/ GENTMYCN HI VISC PALACOS R+G Model/Cat number: [id number]    : [employer] Lot number: [id number]      As of 2/17/2023       Status: Implanted                               Operative procedure: Total knee replacement    OPERATIVE PROCEDURE:  Please note the first assistant role was to help in patient positioning and draping of the extremity in a sterile fashion. Also during the surgery the assistant's responsibilities included but not limited to extremity positioning during critical portions of the surgery. Assisting in using and placement of retractors during surgery. Lower extremity was prepped and draped in a sterile fashion. After adequate anesthesia was given, the patient was placed in a well-padded supine position. Subvastus arthrotomy from the tibial tubercle to the superior pole of the patella was made. Knee was hyperflexed. Intramedullary reaming of distal femur and proximal tibia was performed. 10 mm of distal femur was cut. Anterior-posterior sizing guide was used.   Anterior, posterior, chamfer cuts, and box cuts were made next. Proximal tibial cut and preparation performed. Posterior osteophyte meniscal remnants were removed, and also patella was everted. Free-hand cut of the patella was made. Trial components were placed. The patient was found to have excellent range of motion and stability with all trial components. All the trial components removed. Copious irrigation performed. Distal femur, proximal tibia, and patella were impacted in place. Excessive cement was removed. After the cement was hard, Subvastus arthrotomy closed with Vicryl stitch. Compressive dressing was applied. The patient was taken to PACU in stable condition.       Estela Jacome MD

## 2023-02-17 NOTE — ANESTHESIA PRE PROCEDURE
Department of Anesthesiology  Preprocedure Note       Name:  Jessa Lundy   Age:  65 y.o.  :  1960                                          MRN:  [id number]         Date:  2023      Surgeon: Talisha Todd):  Chelsy Parker MD    Procedure: Procedure(s):  LEFT TKA    Medications prior to admission:   Prior to Admission medications    Medication Sig Start Date End Date Taking? Authorizing Provider   Cholecalciferol (VITAMIN D3) 1.25 MG (66553 UT) CAPS Take 1 capsule by mouth daily    Historical Provider, MD   hydroCHLOROthiazide (MICROZIDE) 12.5 MG capsule Take 12.5 mg by mouth daily    Historical Provider, MD   folic acid (FOLVITE) 1 MG tablet Take 1 mg by mouth daily    Historical Provider, MD   Ascorbic Acid 500 MG CHEW Vitamin C With Skylar Hips 500 mg chewable tablet   1 TABLET,CHEWABLE ORALLY DAILY. Ar Automatic Reconciliation   ascorbic acid (VITAMIN C) 500 MG tablet Take 500 mg by mouth daily    Ar Automatic Reconciliation   diclofenac sodium (VOLTAREN) 1 % GEL Apply 1 g topically every 4 hours as needed 22   Ar Automatic Reconciliation   ibuprofen (ADVIL;MOTRIN) 600 MG tablet Take 600 mg by mouth every 8 hours as needed 22   Ar Automatic Reconciliation   meloxicam (MOBIC) 15 MG tablet ceived the following from Raghavendra.  - [facility]: Outside name: meloxicam (MOBIC) 15 mg tablet 22   Ar Automatic Reconciliation   methotrexate (RHEUMATREX) 2.5 MG chemo tablet TAKE 6 TABLET BY MOUTH ONCE WEEKLY 22   Ar Automatic Reconciliation       Current medications:    No current facility-administered medications for this encounter. Allergies:     Allergies   Allergen Reactions    Tetracyclines & Related Nausea And Vomiting       Problem List:    Patient Active Problem List   Diagnosis Code    Abnormal weight loss R63.4    Anemia D64.9    Arthralgia of left knee M25.562    Bone pain M89.8X9    Hypertension I10    Hypertrophy of breast N62    Malignant neoplasm of breast (female), unspecified site C50.18    Multiple pulmonary nodules R91.8    Other dietary vitamin B12 deficiency anemia D51.3    Strain of left knee K38.862W    Vitamin D deficiency E55.9       Past Medical History:        Diagnosis Date    Breast CA ([facility] [address]     right side    Cancer ([facility] [address]     right breast    Hypertension     Hypertension        Past Surgical History:        Procedure Laterality Date    BREAST SURGERY Right     breast implant    BREAST SURGERY      rt breast implant    JOINT REPLACEMENT Right     TOTAL KNEE ARTHROPLASTY Right        Social History:    Social History     Tobacco Use    Smoking status: Never    Smokeless tobacco: Never   Substance Use Topics    Alcohol use: Not on file                                Counseling given: Not Answered      Vital Signs (Current): There were no vitals filed for this visit. BP Readings from Last 3 Encounters:   No data found for BP       NPO Status:                                                                                 BMI:   Wt Readings from Last 3 Encounters:   01/19/23 157 lb (71.2 kg)   08/23/22 167 lb (75.8 kg)     There is no height or weight on file to calculate BMI.    CBC:   Lab Results   Component Value Date/Time    WBC 6.8 01/11/2023 03:40 PM    RBC 3.73 01/11/2023 03:40 PM    HGB 10.1 01/11/2023 03:40 PM    HCT 32.1 01/11/2023 03:40 PM    MCV 86.1 01/11/2023 03:40 PM    RDW 15.6 01/11/2023 03:40 PM     01/11/2023 03:40 PM       CMP:   Lab Results   Component Value Date/Time     01/11/2023 03:40 PM    K 3.6 01/11/2023 03:40 PM     01/11/2023 03:40 PM    CO2 29 01/11/2023 03:40 PM    BUN 10 01/11/2023 03:40 PM    CREATININE 0.78 01/11/2023 03:40 PM    GLUCOSE 106 01/11/2023 03:40 PM    CALCIUM 9.7 01/11/2023 03:40 PM       POC Tests: No results for input(s): POCGLU, POCNA, POCK, POCCL, POCBUN, POCHEMO, POCHCT in the last 72 hours.     Coags: No results found for: PROTIME, INR, APTT    HCG (If Applicable): No results found for: PREGTESTUR, PREGSERUM, HCG, HCGQUANT     ABGs: No results found for: PHART, PO2ART, URE9YGN, YYH5CHK, BEART, H8DXYLYU     Type & Screen (If Applicable):  No results found for: LABABO, LABRH    Drug/Infectious Status (If Applicable):  No results found for: HIV, HEPCAB    COVID-19 Screening (If Applicable): No results found for: COVID19        Anesthesia Evaluation  Patient summary reviewed and Nursing notes reviewed  Airway: Mallampati: I  TM distance: >3 FB   Neck ROM: full  Mouth opening: > = 3 FB   Dental:          Pulmonary:Negative Pulmonary ROS breath sounds clear to auscultation                             Cardiovascular:Negative CV ROS  Exercise tolerance: good (>4 METS),   (+) hypertension:,         Rhythm: regular  Rate: normal                    Neuro/Psych:   Negative Neuro/Psych ROS              GI/Hepatic/Renal: Neg GI/Hepatic/Renal ROS            Endo/Other: Negative Endo/Other ROS           Medications not reviewed for medical reasons. Abdominal:       Abdomen: soft. Vascular: negative vascular ROS. Other Findings:           Anesthesia Plan      regional, spinal and MAC     ASA 2             Anesthetic plan and risks discussed with patient.       Plan discussed with surgical team.          Post-op pain plan if not by surgeon: single peripheral nerve block            ESTHER Telles CRNA   2/17/2023

## 2023-02-17 NOTE — ANESTHESIA POSTPROCEDURE EVALUATION
Department of Anesthesiology  Postprocedure Note    Patient: Ana Ham  MRN: [id number]  YOB: 1960  Date of evaluation: 2/17/2023      Procedure Summary     Date: 02/17/23 Room / Location: [facility] MAIN 03 / [facility] MAIN OR    Anesthesia Start: 0851 Anesthesia Stop: 1056    Procedure: LEFT TKA (Left: Knee) Diagnosis:       Primary osteoarthritis of left knee      (Primary osteoarthritis of left knee [M17.12])    Surgeons: Brandi Hernandez MD Responsible Provider: ESTHER Mayo CRNA    Anesthesia Type: MAC, Spinal, Regional ASA Status: 2          Anesthesia Type: MAC, Spinal, Regional    Elena Phase I:      Elena Phase II:        Anesthesia Post Evaluation    Patient location during evaluation: PACU  Patient participation: complete - patient participated  Level of consciousness: awake  Pain score: 0  Airway patency: patent  Nausea & Vomiting: no nausea  Complications: no  Cardiovascular status: blood pressure returned to baseline  Respiratory status: acceptable  Hydration status: euvolemic

## 2023-02-18 NOTE — PROGRESS NOTES
PHYSICAL THERAPY EVALUATION AND DISCHARGE    Patient: Julia Laureano (70 y.o. female)  Date: 2/17/2023  Time in: 1448  Time out: 1507  Physical Therapy Time: 19     Timed Minute Breakdown:  PTE: 19      Primary Diagnosis: Primary osteoarthritis of left knee [M17.12] <principal problem not specified>  Present on Admission:  **None**   Procedure(s) (LRB):  LEFT TKA (Left) 0 Day Post-Op   Precautions: WBAT, LLE, Falls risk            Assessment: Pt is s/p L TKA and is WBAT. Pt is able to ambulate with RW mod I x 100 feet. Pt is able to negotiate steps via reciprocal pattern with use of handrails. Pt is educated on HEP and provided with handout for adherence assistance. Pt is appropriate for discharge home with family and plans to attend OP PT next week. Evaluation Activity Tolerance: WNL       Equipment Recommendations for Discharge:        AM-PAC   18/24; Current research shows that an AM-PAC score of 17 or less is typically not associated with a discharge to the patient's home setting, whereas a score of 18 or greater is typically associated with a discharge to the patient's home setting.     Factors which may influence rehabilitation potential include:  Prior impaired baseline level of function  and Medical condition/comorbidities      PLAN : Eval only        SUBJECTIVE:     Pt supine in bed upon entering, agreeable to work with PT.     OBJECTIVE DATA SUMMARY:     Past Medical History:   Diagnosis Date    Breast CA ([facility] [address]     right side    Cancer ([facility] [address]     right breast    Hypertension     Hypertension      Past Surgical History:   Procedure Laterality Date    BREAST SURGERY Right     breast implant    BREAST SURGERY      rt breast implant    JOINT REPLACEMENT Right     TOTAL KNEE ARTHROPLASTY Right     TOTAL KNEE ARTHROPLASTY Left 2/17/2023    LEFT TKA performed by Violeta Fischer MD at [facility] MAIN OR       Home Situation: pt plans to stay with sister before returning to her home; sister's home is 1 story home with 3 steps to enter; pt uses SPC at baseline     Orientation and Cognition: WNL         Gross Assessment  L knee AROM: -15 - 100 degrees  L  knee PROM: - 20 - 90 degrees  L LLE strength: grossly 4/5  Sensation: intact         Functional Mobility:  Bed Mobility:  independent       Transfers:  independent with RW       Balance:  Sitting: Good, Standing : Fair       Ambulation/Stair navigation: mod I with RW on level surfaces x 100 feet  Steps: reciprocal with handrails        Pain: 7/10      After treatment:   []         Patient left in no apparent distress sitting up in chair  [x]         Patient left in no apparent distress in bed  []         Call bell left within reach  [x]         Nursing notified  []         Caregiver/Family present  []         Bed alarm activated  []         SCDs applied    COMMUNICATION/EDUCATION:   Patient Education  Education Given To: Patient  Education Provided: Role of Therapy;Plan of Care;Precautions; Home Exercise Program;Transfer Training;Energy Conservation; Family Education; Fall Prevention Strategies  Education Method: Teach Back  Barriers to Learning: None  Education Outcome: Verbalized understanding    PT GOALS:     Pt will verbalize and/or demonstrate understanding of HEP to improve knee ROM & strength. MET.      Thank you for this referral.  Therapist Signature: Stephany Tompkins PT

## 2023-02-20 ENCOUNTER — HOSPITAL ENCOUNTER (OUTPATIENT)
Age: 63
Setting detail: RECURRING SERIES
Discharge: HOME | End: 2023-02-23
Payer: COMMERCIAL

## 2023-02-20 ENCOUNTER — OFFICE VISIT (OUTPATIENT)
Age: 63
End: 2023-02-20

## 2023-02-20 DIAGNOSIS — Z96.652 STATUS POST LEFT KNEE REPLACEMENT: Primary | ICD-10-CM

## 2023-02-20 PROCEDURE — 97162 PT EVAL MOD COMPLEX 30 MIN: CPT

## 2023-02-20 NOTE — THERAPY EVALUATION
[address] [facility], [address] [facility], [address] [place] [address]  PLAN OF CARE / STATEMENT OF MEDICAL NECESSITY FOR PHYSICAL THERAPY SERVICES  Patient Name: Dhruv Hurtado : 1960   Medical   Diagnosis: Presence of left artificial knee joint [Z96.652] Treatment Diagnosis: Left knee pain   Onset Date: 23     Referral Source: Irwin Herzog MD [place] [facility]): 2023   Prior Hospitalization: See medical history Provider #: [id number]   Prior Level of Function: Independent with [facility]   Comorbidities: Breast CA, R breast surgery, HTN, R TKA, L TKA (23)   Medications: Verified on Patient Summary List   The Plan of Care and following information is based on the information from the initial evaluation.   ==========================================================================================  Assessment / Functional Analysis:    Pt is a 65y.o. year old  female who presents to outpatient clinic today POD#3 L TKA ambulating with RW. Pt presents with impairments that include decreased L knee ROM, left LE weakness, right ankle dorsiflexion weakness, left knee edema, difficulty with functional mobility including transfers, ambulation & step negotiation, decreased standing balance & intermittent pain limiting function.  Pt will benefit from skilled PT intervention to target deficits in effort to maximize pain-free daily activity and to restore PLOF within home, community & workplace as .     ==========================================================================================  Eval Complexity: History: MEDIUM  Complexity : 1-2 comorbidities / personal factors will impact the outcome/ POC Exam:MEDIUM Complexity : 3 Standardized tests and measures addressin body structure, function, activity limitation and / or participation in recreation  Presentation: LOW Complexity : Stable, uncomplicated  Clinical Decision Making:MEDIUM Complexity : FOTO score of 26-74Overall Complexity:MEDIUM    Problem List: pain affecting function, decrease ROM, decrease strength, edema affecting function, impaired gait/ balance, decrease ADL/ functional abilitiies, decrease activity tolerance, decrease flexibility/ joint mobility, and decrease transfer abilities   Treatment Plan may include any combination of the following: Theraputic Exercise, Moist Heat, Manual Therapy, Gait and Balance Training, Teaching of a HEP, and Vasopneumatic Compression and Therapeutic Activity   Patient / Family readiness to learn indicated by: asking questions, trying to perform skills, and interest  Persons(s) to be included in education: patient (P)  Barriers to Learning/Limitations: No      Patient self reported health status: good  Rehabilitation Potential: good    Objective Measures:  Palpation: mild tenderness along left knee, intact to light touch; dressing clean and dry         ROM / Strength  [] Unable to assess                  AROM                         PROM                     Strength       Left Right Left Right Left Right   Hip Flexion         4+/5 5/5     Extension                 Abduction                 Adduction               Knee Flexion 82 110 94   4/5 5/5     Extension -20 -3 -15*   4-/5 5/5   Ankle Plantarflexion         4/5 4/5     Dorsiflexion         4-/5 3+/5*   *indicative of pain     Patellar Mobility:     Hypermobile:       [] Left   [] Right         Hypomobile:   [x] Left   [] Right     Girth Measurements:               Midpatellar:                             Left 44 cm          Right : 40.5 cm         Gait:                [] Normal    [] Abnormal    [x] Antalgic    [] NWB    Device: RW                          Distance: 200 feet with RW on level surfaces; decreased brenda, decreased left knee flexion and extension   Comments: step negotiation via stand -step & reciprocal patterns; cues provided to encourage LE alignment and to avoid hip circumduction and excessive forward bend        Balance: Standing static: Fair, Standing Dynamic: poor      Other tests/comments: 30 second sit to stand test: 7 repetitions   FOTO: 57  LEFS: 45.3    Short Term Goals:  Pt will be independent with HEP to improve L knee ROM & strength in 3 weeks. Pt will improve L knee PROM to -2 - 110 degrees for improved ADL efficiency in 3 weeks. Pt will improve L LE strength by 1/2 MMT grade for improved ability to rise from seated position in 3 weeks. Pt will improve FOTO score by 10 points for improved function in 3 weeks. Long Term Goals:  Pt will improve L knee AROM to -2 -110 degrees for improved ability to bend & stoop with household chores in 6 weeks. Pt will improve L LE strength to 5/5 for improved ability to perform work duties as  in 6 weeks. Pt will be able to perform 12 repetitions during 30 seconds STS test for improved endurance in 6 weeks. Pt will be able to ambulate with LRAD x 1000 feet independently on level/unlevel surfaces for improved ability to navigate community in 6 weeks. Frequency / Duration: Patient to be seen  2  times per week for 6  weeks:  Patient / Caregiver education and instruction: self care and exercises    Therapist Signature: Jalil Way PT, PT. DPT Date: 2/27/3446   Certification Period: 2/20/23 - 5/20/23 Time: 11:07 AM   ===========================================================================================  I certify that the above Physical Therapy Services are being furnished while the patient is under my care. I agree with the treatment plan and certify that this therapy is necessary. Physician Signature:        Date:       Time:     Please sign and return to [facility] PT or you may fax the signed copy to [phone]. Please call [phone] if more information requireank you.

## 2023-02-20 NOTE — THERAPY EVALUATION
KNEE EVAL/ PT DAILY TREATMENT NOTE 10-18    Patient Name: Kye Castro  Date:2023  : 1960  [x]  Patient  Verified  Payor: Courtney Hilario 150 / Plan: Sourav Dodge / Product Type: *No Product type* /    In time:1108  Out time:1155  Total Treatment Time (min): 52  Visit #: 1    Medicare/BCBS Only   Total Timed Codes (min):  0 1:1 Treatment Time:  37     Treatment Area: Presence of left artificial knee joint [Z96.652]    SUBJECTIVE  Pt is s/p L TKA on 23 and enters today with RW. Pt reports feeling like her knee is tight and has had some difficulty sleeping. Pt is currently staying with her sister for a few weeks and is in a one story home with 3 steps to enter. Pt's home has 3 steps to enter and 15 steps to reach bedroom on second floor. Pt is independents with ADLs. Prior to surgery, pt was relying on [employer] [facility]. No fall history. Pt is a  and would like to be able to walk better.        Pt. Goals: \"to walk better\"    Pain Level (0-10 scale): Current : 7/10 ache in left knee         Past Medical History:   Diagnosis Date    Breast CA ([address]     right side    Cancer ([address]     right breast    Hypertension     Hypertension      Past Surgical History:   Procedure Laterality Date    BREAST SURGERY Right     breast implant    BREAST SURGERY      rt breast implant    JOINT REPLACEMENT Right     TOTAL KNEE ARTHROPLASTY Right     TOTAL KNEE ARTHROPLASTY Left 2023    LEFT TKA performed by Audrey Lu [facility] at [facility] MA[facility]       Any medication changes, allergies to medications, adverse drug reactions, diagnosis change, or new procedure performed?: [x] No    [] Yes (see summary sheet for update)          OBJECTIVE/EXAMINATION  Palpation: mild tenderness along left knee, intact to light touch; dressing clean and dry       ROM / Strength  [] Unable to assess                  AROM                         PROM                     Strength     Left Right Left Right Left Right   Hip Flexion     4+/5 5/5 Extension          Abduction          Adduction         Knee Flexion 82 110 94  4/5 5/5    Extension -20 -3 -15*  4-/5 5/5   Ankle Plantarflexion     4/5 4/5    Dorsiflexion     4-/5 3+/5*   *indicative of pain    Patellar Mobility:     Hypermobile: [] Left   [] Right  Hypomobile: [x] Left   [] Right    Girth Measurements:     Midpatellar:               Left 44 cm     Right : 40.5 cm       Gait:  [] Normal    [] Abnormal    [x] Antalgic    [] NWB    Device: RW    Distance: 200 feet with RW on level surfaces; decreased brenda, decreased left knee flexion and extension   Comments: step negotiation via stand -step & reciprocal patterns; cues provided to encourage LE alignment and to avoid hip circumduction and excessive forward bend       Balance: Standing static: Fair, Standing Dynamic: poor     Other tests/comments: 30 second sit to stand test: 7 repetitions   FOTO: 57  LEFS: 45.3       Modality rationale:  To address edema/pain in effort to promote healing & mobility    Min Type Additional Details    [] Estim:  []Unatt       []IFC  []Premod                        []Other:  []w/ice   []w/heat  Position:  Location:    [] Estim: []Att    []TENS instruct  []NMES                    []Other:  []w/US   []w/ice   []w/heat  Position:  Location:         []  Ultrasound: []Continuous   [] Pulsed                           []1MHz   []3MHz Location:  W/cm2:        10 [x]  Ice     []  heat  []  Ice massage  []  Laser   []  Anodyne Position: supine  Location: L knee         []  Vasopneumatic Device Pressure:       [] lo [] med [] hi   Temperature: [] lo [] med [] hi       37 min [x]Eval                  []Re-Eval               With   [] TE   [] TA   [] neuro   [x] other: Patient Education: [x] Review HEP    [] Progressed/Changed HEP based on:   [x] positioning   [x] body mechanics   [x] transfers   [x] heat/ice application    [] other:        Pain Level (0-10 scale) post treatment: 6/10      ASSESSMENT/Functional Analysis     [x] See plan of care  []  Discharge due to:_  []  Other:_      Becki Licona PT, DPT 2/20/2023  11:07 AM

## 2023-02-23 ENCOUNTER — HOSPITAL ENCOUNTER (OUTPATIENT)
Age: 63
Setting detail: RECURRING SERIES
Discharge: HOME | End: 2023-02-26
Payer: COMMERCIAL

## 2023-02-23 PROCEDURE — 97110 THERAPEUTIC EXERCISES: CPT

## 2023-02-23 NOTE — PROGRESS NOTES
PT DAILY TREATMENT NOTE 8-14    Patient Name: Hortencia Huber  Date:2023  : 1960  [x]  Patient  Verified  Payor: Courtney Hilar[address] / Plan: Esperanza Tello / Product Type: *No Product type* /    In time:10:36  Out time:11:39  Total Treatment Time (min): 63  Total Timed Codes (min): 53  1:1 Treatment Time (min): 53   Visit #: 2 of 30    Treatment Area: Presence of left artificial knee joint [Z96.652]    SUBJECTIVE  Reported having a lot of increased pain today. She feels that knee extension is the hardest and causes more pain. Pain Level (0-10 scale):8-9/10    Any medication changes, allergies to medications, adverse drug reactions, diagnosis change, or new procedure performed?: [x] No    [] Yes (see summary sheet for update)        OBJECTIVE  Modality rationale: decrease edema, decrease inflammation, and decrease pain to improve the patients ability to CP/Vaso post session to decrease pain and edema from exercises.       Min Type Additional Details    [] Estim: []Att   []Unatt  []TENS instruct                 []IFC  []Premod []NMES                       []Other:  []w/US   []w/ice   []w/heat  Position:  Location:    []  Traction: [] Cervical       []Lumbar                       [] Prone          []Supine                       []Intermittent   []Continuous Lbs:  [] before manual  [] after manual    []  Ultrasound: []Continuous   [] Pulsed                           []1MHz   []3MHz Location:  W/cm2:    []  Iontophoresis with dexamethasone         Location: [] Take home patch   [] In clinic   10 [x]  Ice     []  heat  []  Ice massage Position: Seated edge of mat  Location:L knee    []  Vasopneumatic Device Pressure: [] lo [] med [] hi   Temp: [] lo [] med [] hi   [] Skin assessment post-treatment:  []intact []redness- no adverse reaction       []redness - adverse reaction:         53 min Therapeutic Exercise:  [x] See flow sheet :   Rationale: increase ROM and increase strength to improve the patients ability to return to PLOF with reduced pain, achieving optimal strength and function to perform household chores and daily activities. min Manual Therapy:    Rationale:      min Gait Training:  _250__ feet with __SPC_ device on level surfaces with supervision/mod I level of assist   Rationale: To improve gait quality. Pt was provided cues for heel toe gait and to prevent toe out position. With TE  TA   NR  GT   [facility] Patient Education: [x] Review HEP    [] Progressed/Changed HEP based on:   [] positioning   [] body mechanics   [] transfers   [] heat/ice application          Pain Level (0-10 scale) post treatment: 6/10    ASSESSMENT/Changes in Function: Began session with self stretching followed by stepper for active warm up and ROM. Initiated HEP/ POC per written flow sheet. She is limited by pain this session even after taking pain medication. Pt needed demonstration with all exercise to understand proper techniques. She is unable to tolerate PROM thi session. Ended session with one lap around gym with SPC to work on gait quality. Ended session with CP to decrease pain ans edema. Will contnue to progress per POC as Pt tolerates. Patient will continue to benefit from skilled PT services to modify and progress therapeutic interventions, analyze and address functional mobility deficits, analyze and address ROM deficits, analyze and address strength deficits, analyze and address soft tissue restrictions, and analyze and cue for proper movement patterns to attain remaining goals.        [x]  See Plan of Care  []  See progress note/recertification  []  See Discharge Summary           PLAN  []  Upgrade activities as tolerated     [x]  Continue plan of care  []  Update interventions per flow sheet       []  Discharge due to:_  []  Other:_      Michael Mock PTA, LPTA 2/23/2023  12:33 PM

## 2023-02-27 ENCOUNTER — HOSPITAL ENCOUNTER (OUTPATIENT)
Age: 63
Setting detail: RECURRING SERIES
Discharge: HOME | End: 2023-03-02
Payer: COMMERCIAL

## 2023-02-27 ENCOUNTER — OFFICE VISIT (OUTPATIENT)
Age: 63
End: 2023-02-27

## 2023-02-27 DIAGNOSIS — M25.562 LEFT KNEE PAIN, UNSPECIFIED CHRONICITY: Primary | ICD-10-CM

## 2023-02-27 PROCEDURE — 97016 VASOPNEUMATIC DEVICE THERAPY: CPT

## 2023-02-27 PROCEDURE — 97110 THERAPEUTIC EXERCISES: CPT

## 2023-02-27 PROCEDURE — 99024 POSTOP FOLLOW-UP VISIT: CPT | Performed by: ORTHOPAEDIC SURGERY

## 2023-02-27 NOTE — PROGRESS NOTES
Name: Estrada Bella    : 1960     [address] AND SPORTS [facility]ICINE  [address] [place] [address] [place]ente, [facility] [address]  Dept: [phone]  Dept Fax: [phone]     Chief Complaint   Patient presents with    Post-Op Check    Knee Pain        There were no vitals taken for this visit. Allergies   Allergen Reactions    Tetracyclines & Related Nausea And Vomiting        Current Outpatient Medications   Medication Sig Dispense Refill    Cholecalciferol (VITAMIN D3) 1.25 MG (38035 UT) CAPS Take 1 capsule by mouth daily      hydroCHLOROthiazide (MICROZIDE) 12.5 MG capsule Take 12.5 mg by mouth daily      folic acid (FOLVITE) 1 MG tablet Take 1 mg by mouth daily      ascorbic acid (VITAMIN C) 500 MG tablet Take 500 mg by mouth daily      diclofenac sodium (VOLTAREN) 1 % GEL Apply 1 g topically every 4 hours as needed      ibuprofen (ADVIL;MOTRIN) 600 MG tablet Take 600 mg by mouth every 8 hours as needed      meloxicam (MOBIC) 15 MG tablet ceived the following from [facility]: Outside name: meloxicam (MOBIC) 15 mg tablet      methotrexate (RHEUMATREX) 2.5 MG chemo tablet TAKE 6 TABLET BY MOUTH ONCE WEEKLY       No current facility-administered medications for this visit. Patient Active Problem List   Diagnosis    Abnormal weight loss    Anemia    Arthralgia of left knee    Bone pain    Hypertension    Hypertrophy of breast    Malignant neoplasm of breast (female), unspecified site    Multiple pulmonary nodules    Other dietary vitamin B12 deficiency anemia    Strain of left knee    Vitamin D deficiency      History reviewed. No pertinent family history.    Social History     Socioeconomic History    Marital status: Single     Spouse name: None    Number of children: None    Years of education: None    Highest education level: None   Tobacco Use    Smoking status: Never    Smokeless tobacco: Never   Vaping Use    Vaping Use: Never used   Substance and Sexual Activity    Drug use: Never      Past Surgical History:   Procedure Laterality Date    BREAST SURGERY Right     breast implant    BREAST SURGERY      rt breast implant    JOINT REPLACEMENT Right     TOTAL KNEE ARTHROPLASTY Right     TOTAL KNEE ARTHROPLASTY Left 2/17/2023    LEFT TKA performed by Halle Pat MD at [facility] MAIN OR      Past Medical History:   Diagnosis Date    Breast CA [address] [address] [address]     right side    Cancer ([facility]     right breast    Hypertension     Hypertension         I have reviewed and agree with [address] [place] [address] Nw and ROS and intake form in chart and the record furthermore I have reviewed prior medical record(s) regarding this patients care during this appointment. Review of Systems:   Patient is a pleasant appearing individual, appropriately dressed, well hydrated, well nourished, who is alert, appropriately oriented for age, and in no acute distress with a normal gait and normal affect who does not appear to be in any significant pain. Physical Exam:  Left knee - Neurovascularly intact with good cap refill, full range of motion and full strength, well healed incision noted, no swelling, no erythema, no instability. Right knee - Decrease range of motion with flexion, Some crepitation, Grossly neurovascularly intact, Good cap refill, No skin lesion, Moderate swelling, No gross instability, Some quadriceps weakness    Visit Diagnoses         Codes    Left knee pain, unspecified chronicity    -  Primary M25.562               Physical examination is positive for incision clean, dry and intact. HPI:  The patient is here status post left total knee replacement, doing well, just a little bit of soreness. Surgery was on 02/17/2023. Assessment/Plan:  Plan at this point, activities as tolerated started, no restrictions from my standpoint. We will see the patient back as needed and go from there.     As part of continued conservative pain management options the patient was advised to utilize Tylenol or OTC NSAIDS as long as it is not medically contraindicated. Return to Office: Follow-up and Dispositions    Return in about 4 weeks (around 3/27/2023) for MIK HARDY. Scribed by Lucy Richards LPN as dictated by [facility] RESPONSE [place] IRAIDA Allred MD.  Documentation, performed by, True and Accepted Qasim Allred MD

## 2023-02-27 NOTE — PATIENT INSTRUCTIONS
Knee Pain or Injury: Care Instructions  Overview     Injuries are a common cause of knee problems. Sudden (acute) injuries may be caused by a direct blow to the knee. They can also be caused by abnormal twisting, bending, or falling on the knee. Pain, bruising, or swelling may be severe, and may start within minutes of the injury. Overuse is another cause of knee pain. Other causes are climbing stairs, kneeling, and other activities that use the knee. Everyday wear and tear, especially as you get older, also can cause knee pain. Rest, along with home treatment, often relieves pain and allows your knee to heal. If you have a serious knee injury, you may need tests and treatment. Follow-up care is a key part of your treatment and safety. Be sure to make and go to all appointments, and call your doctor if you are having problems. It's also a good idea to know your test results and keep a list of the medicines you take. How can you care for yourself at home? Be safe with medicines. Read and follow all instructions on the label. If the doctor gave you a prescription medicine for pain, take it as prescribed. If you are not taking a prescription pain medicine, ask your doctor if you can take an over-the-counter medicine. Rest and protect your knee. Take a break from any activity that may cause pain. Put ice or a cold pack on your knee for 10 to 20 minutes at a time. Put a thin cloth between the ice and your skin. Prop up a sore knee on a pillow when you ice it or anytime you sit or lie down for the next 3 days. Try to keep it above the level of your heart. This will help reduce swelling. If your knee is not swollen, you can put moist heat, a heating pad, or a warm cloth on your knee. If your doctor recommends an elastic bandage, sleeve, or other type of support for your knee, wear it as directed. Follow your doctor's instructions about how much weight you can put on your leg.  Use a cane, crutches, or a walker as instructed. Follow your doctor's instructions about activity during your healing process. If you can do mild exercise, slowly increase your activity. Stay at a healthy weight. Extra weight can strain the joints, especially the knees and hips, and make the pain worse. Losing a few pounds may help. When should you call for help? Call 911 anytime you think you may need emergency care. For example, call if:    You have symptoms of a blood clot in your lung (called a pulmonary embolism). These may include:  Sudden chest pain. Trouble breathing. Coughing up blood. Call your doctor now or seek immediate medical care if:    You have severe or increasing pain. Your leg or foot turns cold or changes color. You cannot stand or put weight on your knee. Your knee looks twisted or bent out of shape. You cannot move your knee. You have signs of infection, such as: Increased pain, swelling, warmth, or redness. Red streaks leading from the knee. Pus draining from a place on your knee. A fever. You have signs of a blood clot in your leg (called a deep vein thrombosis), such as:  Pain in your calf, back of the knee, thigh, or groin. Redness and swelling in your leg or groin. Watch closely for changes in your health, and be sure to contact your doctor if:    You have tingling, weakness, or numbness in your knee. You have any new symptoms, such as swelling. You have bruises from a knee injury that last longer than 2 weeks. You do not get better as expected. Where can you learn more? Go to http://www.woods.com/ and enter K195 to learn more about \"Knee Pain or Injury: Care Instructions. \"  Current as of: March 9, 2022               Content Version: 13.5  © 9767-4167 [employer]. Care instructions adapted under license by [facility] ([facility]).  If you have questions about a medical condition or this instruction, always ask your healthcare professional. [employer] disclaims any warranty or liability for your use of this information.

## 2023-02-27 NOTE — PROGRESS NOTES
Physical Therapy    PT DAILY TREATMENT NOTE     Patient Name: Mckinley Cease  Date:2023  : 1960  [x]  Patient  Verified  Payor: Sveta Paige / Plan: Logan Min / Product Type: *No Product type* /    In time:1216  Out time:1314  Total Treatment Time (min): 58  Total Timed Codes (min): 48  1:1 Treatment Time (min): 48   Visit #: 3 of 30    Treatment Area: Presence of left artificial knee joint [Z96.652]    SUBJECTIVE  Pt expressed no new c/o, some pain but ok. Stated that bandaging was bothering her worse than anything else. She has surgical FU after PT session today. Pain Level (0-10 scale): 6    Any medication changes, allergies to medications, adverse drug reactions, diagnosis change, or new procedure performed?: [x] No    [] Yes (see summary sheet for update)        OBJECTIVE  Modality rationale: decrease inflammation and decrease pain to improve the patients ability to CP/Vaso post session to decrease pain and edema from exercises.       Min Type Additional Details    [] Estim: []Att   []Unatt  []TENS instruct                 []IFC  []Premod []NMES                       []Other:  []w/US   []w/ice   []w/heat  Position:  Location:    []  Traction: [] Cervical       []Lumbar                       [] Prone          []Supine                       []Intermittent   []Continuous Lbs:  [] before manual  [] after manual    []  Ultrasound: []Continuous   [] Pulsed                           []1MHz   []3MHz Location:  W/cm2:    []  Iontophoresis with dexamethasone         Location: [] Take home patch   [] In clinic    []  Ice     []  heat  []  Ice massage Position:  Location:   10 [x]  Vasopneumatic Device Pressure: [x] lo [] med [] hi   Temp: [x] lo [] med [] hi   [] Skin assessment post-treatment:  []intact []redness- no adverse reaction       []redness - adverse reaction:     48 min Therapeutic Exercise:  [x] See flow sheet :   Rationale: increase ROM, increase strength, improve coordination, and improve balance to improve the patients ability to return to prior level of function before injury/illness with reduced pain, achieving optimal strength and function to perform household tasks, daily activities, and return to community events, and/or work. min Therapeutic Activity:  []  See flow sheet :   Rationale:    min Neuromuscular Re-education:  []  See flow sheet :   Rationale:    min Manual Therapy:     Rationale:    min Gait Training:  ___ feet with ___ device on level surfaces with ___ level of assist   Rationale: With TE  TA   NR  GT   Misc Patient Education: [x] Review HEP    [] Progressed/Changed HEP based on:   [] positioning   [] body mechanics   [] transfers   [] heat/ice application        Pain Level (0-10 scale) post treatment: 4    ASSESSMENT/Changes in Function:   Began session with warm up on stepper, followed by stretches on slant box, mini squats, difficulty with heel raises today, HSC, and seated LAQs, supine heel slides, and QS. Plan to introduce SLR and possibly step ups next session. Vaso post session to decrease soreness and/or pain. Pt has FU post PT session today with surgeon. Cont POC. Patient will continue to benefit from skilled PT services to modify and progress therapeutic interventions, analyze and address functional mobility deficits, analyze and address ROM deficits, analyze and address strength deficits, and analyze and cue for proper movement patterns to attain remaining goals.        [x]  See Plan of Care  []  See progress note/recertification  []  See Discharge Summary           PLAN  [x]  Upgrade activities as tolerated     [x]  Continue plan of care  []  Update interventions per flow sheet       []  Discharge due to:_  []  Other:_      Coco Mera PTA, LPTA 2/27/2023  2:19 PM

## 2023-03-01 ENCOUNTER — HOSPITAL ENCOUNTER (OUTPATIENT)
Age: 63
Setting detail: RECURRING SERIES
Discharge: HOME | End: 2023-03-04
Payer: COMMERCIAL

## 2023-03-01 PROCEDURE — 97116 GAIT TRAINING THERAPY: CPT

## 2023-03-01 PROCEDURE — 97110 THERAPEUTIC EXERCISES: CPT

## 2023-03-01 NOTE — PROGRESS NOTES
Physical Therapy    PT DAILY TREATMENT NOTE     Patient Name: Luca Guerra  Date:3/1/2023  : 1960  [x]  Patient  Verified  Payor: Kiara Aragon / Plan: Sandhya Anderson / Product Type: *No Product type* /    In time: 10:22  Out time:11:23  Total Treatment Time (min): 63  Total Timed Codes (min): 53  1:1 Treatment Time (min): 53   Visit #: 4 of 30    Treatment Area: Presence of left artificial knee joint [Z96.652]    SUBJECTIVE  Upon arrival pt states that her left foot keeps turning outward and had done so prior to surgery. Complaint of pain in her left knee. Pain Level (0-10 scale): 6    Any medication changes, allergies to medications, adverse drug reactions, diagnosis change, or new procedure performed?: [x] No    [] Yes (see summary sheet for update)        OBJECTIVE  Modality rationale: decrease inflammation and decrease pain to improve the patients ability to CP/Vaso post session to decrease pain and edema from exercises.       Min Type Additional Details    [] Estim: []Att   []Unatt  []TENS instruct                 []IFC  []Premod []NMES                       []Other:  []w/US   []w/ice   []w/heat  Position:  Location:    []  Traction: [] Cervical       []Lumbar                       [] Prone          []Supine                       []Intermittent   []Continuous Lbs:  [] before manual  [] after manual    []  Ultrasound: []Continuous   [] Pulsed                           []1MHz   []3MHz Location:  W/cm2:    []  Iontophoresis with dexamethasone         Location: [] Take home patch   [] In clinic   10 [x]  Ice     []  heat  []  Ice massage Position: Seated   Location: L knee    []  Vasopneumatic Device Pressure: [] lo [] med [] hi   Temp: [] lo [] med [] hi   [] Skin assessment post-treatment:  []intact []redness- no adverse reaction       []redness - adverse reaction:     45 min Therapeutic Exercise:  [x] See flow sheet :   Rationale: increase ROM, increase strength, improve coordination, and improve balance to improve the patients ability to return to prior level of function before injury/illness with reduced pain, achieving optimal strength and function to perform household tasks, daily activities, and return to community events, and/or work. min Therapeutic Activity:  []  See flow sheet :   Rationale:    min Neuromuscular Re-education:  []  See flow sheet :   Rationale:    min Manual Therapy:     Rationale:   8 min Gait Trainin feet with no device on level surfaces with  MI level of assist   Rationale: With TE  TA   NR  GT   Misc Patient Education: [x] Review HEP    [] Progressed/Changed HEP based on:   [] positioning   [] body mechanics   [] transfers   [] heat/ice application        Pain Level (0-10 scale) post treatment: 3    ASSESSMENT/Changes in Function:   Began session with warm up on stepper, followed by stretches on slant box, mini squats, heel raises, HSC, and seated LAQs, supine heel slides, and QS. Introduced SLR and static knee extension for 3 minutes with 1# weight resistance. CP post session to decrease soreness and/or pain. Cont POC. Patient will continue to benefit from skilled PT services to modify and progress therapeutic interventions, analyze and address functional mobility deficits, analyze and address ROM deficits, analyze and address strength deficits, and analyze and cue for proper movement patterns to attain remaining goals.        [x]  See Plan of Care  []  See progress note/recertification  []  See Discharge Summary           PLAN  [x]  Upgrade activities as tolerated     [x]  Continue plan of care  []  Update interventions per flow sheet       []  Discharge due to:_  []  Other:_      Patria Davis PTA   3/1/2023  12:13 PM

## 2023-03-03 ENCOUNTER — HOSPITAL ENCOUNTER (OUTPATIENT)
Age: 63
Setting detail: RECURRING SERIES
Discharge: HOME | End: 2023-03-06
Payer: COMMERCIAL

## 2023-03-03 PROCEDURE — 97110 THERAPEUTIC EXERCISES: CPT

## 2023-03-03 PROCEDURE — 97016 VASOPNEUMATIC DEVICE THERAPY: CPT

## 2023-03-03 NOTE — PROGRESS NOTES
Physical Therapy    PT DAILY TREATMENT NOTE     Patient Name: Joellen Green  Date:3/3/2023  : 1960  [x]  Patient  Verified  Payor: ABRAM / Plan: [facility] / Product Type: *No Product type* /    In time:1056  Out time: 1152  Total Treatment Time (min): 58  Total Timed Codes (min): 48  1:1 Treatment Time (min): 48  Visit #: 5 of 30    Treatment Area: Presence of left artificial knee joint [Z96.652]    SUBJECTIVE  Pt expressed no new c/o, some pain but ok. Stated that her R ankle was really what was hurting. No pain in knee.     Pain Level (0-10 scale): 7, R ankle    Any medication changes, allergies to medications, adverse drug reactions, diagnosis change, or new procedure performed?: [x] No    [] Yes (see summary sheet for update)        OBJECTIVE  Modality rationale: decrease inflammation and decrease pain to improve the patient’s ability to CP/Vaso post session to decrease pain and edema from exercises.      Min Type Additional Details    [] Estim: []Att   []Unatt  []TENS instruct                 []IFC  []Premod []NMES                       []Other:  []w/US   []w/ice   []w/heat  Position:  Location:    []  Traction: [] Cervical       []Lumbar                       [] Prone          []Supine                       []Intermittent   []Continuous Lbs:  [] before manual  [] after manual    []  Ultrasound: []Continuous   [] Pulsed                           []1MHz   []3MHz Location:  W/cm2:    []  Iontophoresis with dexamethasone         Location: [] Take home patch   [] In clinic    []  Ice     []  heat  []  Ice massage Position:  Location:   10 [x]  Vasopneumatic Device Pressure: [x] lo [] med [] hi   Temp: [x] lo [] med [] hi   [] Skin assessment post-treatment:  []intact []redness- no adverse reaction       []redness - adverse reaction:     48 min Therapeutic Exercise:  [x] See flow sheet :   Rationale: increase ROM, increase strength, improve coordination, and improve balance to improve the  patients ability to return to prior level of function before injury/illness with reduced pain, achieving optimal strength and function to perform household tasks, daily activities, and return to community events, and/or work. min Therapeutic Activity:  []  See flow sheet :   Rationale:    min Neuromuscular Re-education:  []  See flow sheet :   Rationale:    min Manual Therapy:     Rationale:    min Gait Training:  ___ feet with ___ device on level surfaces with ___ level of assist   Rationale: With TE  TA   NR  GT   Misc Patient Education: [x] Review HEP    [] Progressed/Changed HEP based on:   [] positioning   [] body mechanics   [] transfers   [] heat/ice application        Pain Level (0-10 scale) post treatment: 5, ankle pain    ASSESSMENT/Changes in Function:   Began session with warm up on stepper, followed by stretches on slant box, mini squats, held heel raises secondary to ankle pain, HSC, and seated LAQs, supine heel slides, and QS,  introduced SLR. Vaso post session to decrease soreness and/or pain. Pt tolerated well except what bothred ankle. She is planning to head home to her own house this weekend. Patient will continue to benefit from skilled PT services to modify and progress therapeutic interventions, analyze and address functional mobility deficits, analyze and address ROM deficits, analyze and address strength deficits, and analyze and cue for proper movement patterns to attain remaining goals.        [x]  See Plan of Care  []  See progress note/recertification  []  See Discharge Summary           PLAN  [x]  Upgrade activities as tolerated     [x]  Continue plan of care  []  Update interventions per flow sheet       []  Discharge due to:_  []  Other:_      Johanne Plater, PTA , LPTA 3/3/2023  1:45 PM

## 2023-03-06 ENCOUNTER — HOSPITAL ENCOUNTER (OUTPATIENT)
Age: 63
Setting detail: RECURRING SERIES
Discharge: HOME | End: 2023-03-09
Payer: COMMERCIAL

## 2023-03-06 PROCEDURE — 97016 VASOPNEUMATIC DEVICE THERAPY: CPT

## 2023-03-06 PROCEDURE — 97110 THERAPEUTIC EXERCISES: CPT

## 2023-03-06 NOTE — PROGRESS NOTES
Physical Therapy    PT DAILY TREATMENT NOTE     Patient Name: Ana Ham  Date:3/6/2023  : 1960  [x]  Patient  Verified  Payor: Alexia Ellison / Plan: Jodi Leach / Product Type: *No Product type* /    In time:10:21  Out time: 11:18  Total Treatment Time (min): 57  Total Timed Codes (min): 47  1:1 Treatment Time (min): 47  Visit #: 6 of 30    Treatment Area: Presence of left artificial knee joint [Z96.652]    SUBJECTIVE  Pt reports no pain just knee stiffness. Pain Level (0-10 scale): 0/10    Any medication changes, allergies to medications, adverse drug reactions, diagnosis change, or new procedure performed?: [x] No    [] Yes (see summary sheet for update)        OBJECTIVE  Modality rationale: CP/Vaso post session to decrease pain and edema from exercises.       Min Type Additional Details    [] Estim: []Att   []Unatt  []TENS instruct                 []IFC  []Premod []NMES                       []Other:  []w/US   []w/ice   []w/heat  Position:  Location:    []  Traction: [] Cervical       []Lumbar                       [] Prone          []Supine                       []Intermittent   []Continuous Lbs:  [] before manual  [] after manual    []  Ultrasound: []Continuous   [] Pulsed                           []1MHz   []3MHz Location:  W/cm2:    []  Iontophoresis with dexamethasone         Location: [] Take home patch   [] In clinic    []  Ice     []  heat  []  Ice massage Position:  Location:   10 [x]  Vasopneumatic Device Pressure: [x] lo [] med [] hi   Temp: [x] lo [] med [] hi   [] Skin assessment post-treatment:  []intact []redness- no adverse reaction       []redness - adverse reaction:     47 min Therapeutic Exercise:  [x] See flow sheet :   Rationale: increase ROM, increase strength, improve coordination, and improve balance to improve the patients ability to return to prior level of function before injury/illness with reduced pain, achieving optimal strength and function to perform household tasks, daily activities, and return to community events, and/or work. min Therapeutic Activity:  []  See flow sheet :   Rationale:    min Neuromuscular Re-education:  []  See flow sheet :   Rationale:    min Manual Therapy:     Rationale:    min Gait Training:  ___ feet with ___ device on level surfaces with ___ level of assist   Rationale: With TE  TA   NR  GT   Misc Patient Education: [x] Review HEP    [] Progressed/Changed HEP based on:   [] positioning   [] body mechanics   [] transfers   [] heat/ice application        Pain Level (0-10 scale) post treatment: 0/10    ASSESSMENT/Changes in Function:   Began session with warm up on stepper, followed by stretches on slant box, mini squats, held heel raises secondary to ankle pain, HSC, and seated LAQs, supine heel slides, and QS, SLR. Vaso post session to decrease soreness and/or pain. Pt tolerated well with no pain noted in L knee during session. Patient will continue to benefit from skilled PT services to modify and progress therapeutic interventions, analyze and address functional mobility deficits, analyze and address ROM deficits, analyze and address strength deficits, and analyze and cue for proper movement patterns to attain remaining goals.        [x]  See Plan of Care  []  See progress note/recertification  []  See Discharge Summary           PLAN  [x]  Upgrade activities as tolerated     [x]  Continue plan of care  []  Update interventions per flow sheet       []  Discharge due to:_  []  Other:_      Nicola Talamantes PTA , LPTA 3/6/2023  11:18 AM

## 2023-03-10 ENCOUNTER — HOSPITAL ENCOUNTER (OUTPATIENT)
Age: 63
Setting detail: RECURRING SERIES
Discharge: HOME | End: 2023-03-13
Payer: COMMERCIAL

## 2023-03-10 PROCEDURE — 97110 THERAPEUTIC EXERCISES: CPT

## 2023-03-10 PROCEDURE — 97016 VASOPNEUMATIC DEVICE THERAPY: CPT

## 2023-03-10 NOTE — PROGRESS NOTES
Physical Therapy  Physical Therapy    PT DAILY TREATMENT NOTE     Patient Name: Kenzie Cavanaugh  Date:3/10/2023  : 1960  [x]  Patient  Verified  Payor: Khanh Decker / Plan: Myrna Acevedoor / Product Type: *No Product type* /    In time:10:20  Out time: 11:18  Total Treatment Time (min): 58  Total Timed Codes (min): 48  1:1 Treatment Time (min): 48  Visit #:  30    Treatment Area: Presence of left artificial knee joint [Z96.652]    SUBJECTIVE  Pt reports no pain just knee stiffness. Reports that her ankle is sore this date. Pain Level (0-10 scale): 0/10    Any medication changes, allergies to medications, adverse drug reactions, diagnosis change, or new procedure performed?: [x] No    [] Yes (see summary sheet for update)        OBJECTIVE  Modality rationale: CP/Vaso post session to decrease pain and edema from exercises.       Min Type Additional Details    [] Estim: []Att   []Unatt  []TENS instruct                 []IFC  []Premod []NMES                       []Other:  []w/US   []w/ice   []w/heat  Position:  Location:    []  Traction: [] Cervical       []Lumbar                       [] Prone          []Supine                       []Intermittent   []Continuous Lbs:  [] before manual  [] after manual    []  Ultrasound: []Continuous   [] Pulsed                           []1MHz   []3MHz Location:  W/cm2:    []  Iontophoresis with dexamethasone         Location: [] Take home patch   [] In clinic    []  Ice     []  heat  []  Ice massage Position:  Location:   10 [x]  Vasopneumatic Device Pressure: [x] lo [] med [] hi   Temp: [x] lo [] med [] hi   [] Skin assessment post-treatment:  []intact []redness- no adverse reaction       []redness - adverse reaction:     48 min Therapeutic Exercise:  [x] See flow sheet :   Rationale: increase ROM, increase strength, improve coordination, and improve balance to improve the patients ability to return to prior level of function before injury/illness with reduced pain, achieving optimal strength and function to perform household tasks, daily activities, and return to community events, and/or work. min Therapeutic Activity:  []  See flow sheet :   Rationale:    min Neuromuscular Re-education:  []  See flow sheet :   Rationale:    min Manual Therapy:     Rationale:    min Gait Training:  ___ feet with ___ device on level surfaces with ___ level of assist   Rationale: With TE  TA   NR  GT   Misc Patient Education: [x] Review HEP    [] Progressed/Changed HEP based on:   [] positioning   [] body mechanics   [] transfers   [] heat/ice application        Pain Level (0-10 scale) post treatment: 0/10    ASSESSMENT/Changes in Function:   Began session with warm up on stepper, followed by stretches on slant box, mini squats, held heel raises secondary to ankle pain, HSC (1#), and seated LAQs (1#), supine heel slides, and QS, SLR (1#). AROM of L knee - degrees. Continues to have range of motion deficit especially with knee extension. Instructed pt to perform seated quad stretch at home with nothing under her knee for support to allow gravity to pull downward on the knee joint to improve deficit. Vaso post session to decrease soreness and/or pain. Pt tolerated well with no pain noted in L knee during session. Patient will continue to benefit from skilled PT services to modify and progress therapeutic interventions, analyze and address functional mobility deficits, analyze and address ROM deficits, analyze and address strength deficits, and analyze and cue for proper movement patterns to attain remaining goals.        [x]  See Plan of Care  []  See progress note/recertification  []  See Discharge Summary           PLAN  [x]  Upgrade activities as tolerated     [x]  Continue plan of care  []  Update interventions per flow sheet       []  Discharge due to:_  []  Other:_      Dina Denney PTA, LPTA 3/10/2023  11:18 AM

## 2023-03-14 ENCOUNTER — HOSPITAL ENCOUNTER (OUTPATIENT)
Age: 63
Setting detail: RECURRING SERIES
Discharge: HOME | End: 2023-03-17
Payer: COMMERCIAL

## 2023-03-14 PROCEDURE — 97016 VASOPNEUMATIC DEVICE THERAPY: CPT

## 2023-03-14 PROCEDURE — 97110 THERAPEUTIC EXERCISES: CPT

## 2023-03-14 NOTE — PROGRESS NOTES
Physical Therapy      PT DAILY TREATMENT NOTE     Patient Name: Cathleen Lima  Date:3/14/2023  : 1960  [x]  Patient  Verified  Payor: Courtney Hilario 150 / Plan: Raul Enciso / Product Type: *No Product type* /    In time:10:26  Out time: 11:25  Total Treatment Time (min): 59  Total Timed Codes (min): 49  1:1 Treatment Time (min): 49  Visit #: 8 of 30    Treatment Area: Presence of left artificial knee joint [Z96.652]    SUBJECTIVE  Pt reports no pain just knee stiffness. No other complaints. Pain Level (0-10 scale): 0/10    Any medication changes, allergies to medications, adverse drug reactions, diagnosis change, or new procedure performed?: [x] No    [] Yes (see summary sheet for update)        OBJECTIVE  Modality rationale: Vaso post session to decrease pain and edema from exercises.       Min Type Additional Details    [] Estim: []Att   []Unatt  []TENS instruct                 []IFC  []Premod []NMES                       []Other:  []w/US   []w/ice   []w/heat  Position:  Location:    []  Traction: [] Cervical       []Lumbar                       [] Prone          []Supine                       []Intermittent   []Continuous Lbs:  [] before manual  [] after manual    []  Ultrasound: []Continuous   [] Pulsed                           []1MHz   []3MHz Location:  W/cm2:    []  Iontophoresis with dexamethasone         Location: [] Take home patch   [] In clinic    []  Ice     []  heat  []  Ice massage Position:  Location:   10 [x]  Vasopneumatic Device Pressure: [x] lo [] med [] hi   Temp: [x] lo [] med [] hi   [] Skin assessment post-treatment:  []intact []redness- no adverse reaction       []redness - adverse reaction:     49 min Therapeutic Exercise:  [x] See flow sheet :   Rationale: increase ROM, increase strength, improve coordination, and improve balance to improve the patients ability to return to prior level of function before injury/illness with reduced pain, achieving optimal strength and function to perform household tasks, daily activities, and return to community events, and/or work. min Therapeutic Activity:  []  See flow sheet :   Rationale:    min Neuromuscular Re-education:  []  See flow sheet :   Rationale:    min Manual Therapy:     Rationale:    min Gait Training:  ___ feet with ___ device on level surfaces with ___ level of assist   Rationale: With TE  TA   NR  GT   [facility] Patient Education: [x] Review HEP    [] Progressed/Changed HEP based on:   [] positioning   [] body mechanics   [] transfers   [] heat/ice application        Pain Level (0-10 scale) post treatment: 0/10    ASSESSMENT/Changes in Function:   Began session with warm up on stepper, followed by stretches on slant box, mini squats, held heel raises secondary to ankle pain, HSC (1#), and seated LAQs (1#), supine heel slides, and QS, SLR (1#). AROM of L knee - degrees. Continues to have range of motion deficit especially with knee extension. Instructed pt to perform seated quad stretch at home with nothing under her knee for support to allow gravity to pull downward on the knee joint to improve deficit. Introduced TKE at [employer] using [employer] #4 and leg press with quad engagement at 33#. Revisited stair ascending and descending and step ups. No adverse reaction with exercise. Vaso post session to decrease soreness and/or pain. Pt tolerated well with no pain noted in L knee during session. Patient will continue to benefit from skilled PT services to modify and progress therapeutic interventions, analyze and address functional mobility deficits, analyze and address ROM deficits, analyze and address strength deficits, and analyze and cue for proper movement patterns to attain remaining goals.        [x]  See Plan of Care  []  See progress note/recertification  []  See Discharge Summary           PLAN  [x]  Upgrade activities as tolerated     [x]  Continue plan of care  []  Update interventions per flow sheet []  Discharge due to:_  []  Other:_      Noemy Michel PTA , LPTA 3/14/2023  1:55  PM

## 2023-03-16 ENCOUNTER — HOSPITAL ENCOUNTER (OUTPATIENT)
Age: 63
Setting detail: RECURRING SERIES
Discharge: HOME | End: 2023-03-19
Payer: COMMERCIAL

## 2023-03-16 PROCEDURE — 97110 THERAPEUTIC EXERCISES: CPT

## 2023-03-16 PROCEDURE — 97016 VASOPNEUMATIC DEVICE THERAPY: CPT

## 2023-03-20 ENCOUNTER — HOSPITAL ENCOUNTER (OUTPATIENT)
Age: 63
Setting detail: RECURRING SERIES
Discharge: HOME | End: 2023-03-23
Payer: COMMERCIAL

## 2023-03-20 PROCEDURE — 97110 THERAPEUTIC EXERCISES: CPT

## 2023-03-20 NOTE — THERAPY RECERTIFICATION
Carlos Alvar[address]  Phone: [phone]    Fax: [phone]   Progress Note/CONTINUED PLAN OF CARE for PHYSICAL THERAPY          Patient Name: Kavita Mason : 1960   Treatment/Medical Diagnosis: Presence of left artificial knee joint [Z96.652]   Onset Date: 23    Referral Source: Hermilo Lucio MD Start of [facility]): 23   Prior Hospitalization: See Medical History Provider #: [id number]   Prior Level of Function: Independent with [facility]   Comorbidities:  Breast CA, R breast surgery, HTN, R TKA, L TKA (23)      Medications: Verified on Patient Summary List   Visits from [facility]: 10 Missed Visits: 0       SUBJECTIVE  Pt without new complaints upon arrival today, states that she plans to RTW next month. Pt to follow-up with surgeon next week. Pt also plans to re-enroll at local gym.      Objective Measures:  Palpation: mild tenderness along left knee, intact to light touch; dressing clean and dry         ROM / Strength  [] Unable to assess                  AROM                         PROM                     Strength       Left Right Left Right Left Right   Hip Flexion         4+/5 5/5     Extension                 Abduction                 Adduction               Knee Flexion 108 110 120   5/5 5/5     Extension -10 -3 -5   5/5 5/5   Ankle Plantarflexion         4+/5 4/5     Dorsiflexion         4+/5 3+/5*   *indicative of pain     Patellar Mobility:     Hypermobile:       [] Left   [] Right         Hypomobile:   [x] Left   [] Right        Gait:                [] Normal    [] Abnormal    [x] Antalgic    [] NWB    Device: no AD                          Distance: > 500 feet , decreased brenda, decreased knee extension during stance  Comments: step negotiation via reciprocal pattern with single handrail; cues for upright posture      Balance: Standing static: Good, Standing Dynamic: Fair     Other tests/comments: 30 second sit

## 2023-03-20 NOTE — PROGRESS NOTES
PT DAILY TREATMENT NOTE 8    Patient Name: Nikolai Varghese  Date:3/20/2023  : 1960  [x]  Patient  Verified  Payor: Cj Blaes / Plan: Maggie Sarmiento / Product Type: *No Product type* /    In time:1006  Out time:1103  Total Treatment Time (min): 57  Total Timed Codes (min): 47  1:1 Treatment Time (min): 47   Visit # 10 of 30      Treatment Area: Presence of left artificial knee joint [Z96.652]    SUBJECTIVE  Pt without new complaints upon arrival today, states that she plans to RTW next month. Pt to follow-up with surgeon next week. Pt also plans to re-enroll at local gym.    Pain Level (0-10 scale): 0/10  Any medication changes, allergies to medications, adverse drug reactions, diagnosis change, or new procedure performed?: [x] No    [] Yes (see summary sheet for update)        OBJECTIVE  Modality rationale: decrease edema and decrease pain to improve the patients ability to move/heal optimally   Min Type Additional Details    [] Estim: []Att   []Unatt  []TENS instruct                 []IFC  []Premod []NMES                       []Other:  []w/US   []w/ice   []w/heat  Position:  Location:    []  Traction: [] Cervical       []Lumbar                       [] Prone          []Supine                       []Intermittent   []Continuous Lbs:  [] before manual  [] after manual    []  Ultrasound: []Continuous   [] Pulsed                           []1MHz   []3MHz Location:  W/cm2:    []  Iontophoresis with dexamethasone         Location: [] Take home patch   [] In clinic   10 [x]  Ice     []  heat  []  Ice massage Position: sitting with LE propped  Location: L knee    []  Vasopneumatic Device Pressure: [] lo [] med [] hi   Temp: [] lo [] med [] hi           47 min Therapeutic Exercise:  [x] See flow sheet :   Rationale: increase ROM and increase strength to improve the patients ability to restore PLOF               With TE  TA   NR  GT   Misc Patient Education: [x] Review HEP    [] Progressed/Changed HEP based on:

## 2023-03-24 ENCOUNTER — HOSPITAL ENCOUNTER (OUTPATIENT)
Age: 63
Setting detail: RECURRING SERIES
Discharge: HOME | End: 2023-03-27
Payer: COMMERCIAL

## 2023-03-24 PROCEDURE — 97016 VASOPNEUMATIC DEVICE THERAPY: CPT

## 2023-03-24 PROCEDURE — 97110 THERAPEUTIC EXERCISES: CPT

## 2023-03-24 NOTE — PROGRESS NOTES
PT DAILY TREATMENT NOTE 8-    Patient Name: Marivel Alvarez  Date:3/24/2023  : 1960  [x]  Patient  Verified  Payor: Christina Cazares / Plan: Almaz Simms / Product Type: *No Product type* /    In time:1005  Out time:1100  Total Treatment Time (min): 55  Total Timed Codes (min): 45  1:1 Treatment Time (min): 45   Visit # 11 of 30      Treatment Area: Presence of left artificial knee joint [Z96.652]    SUBJECTIVE  Pt without new complaints upon arrival today, states that she plans to RTW next month.     Pain Level (0-10 scale): 0/10  Any medication changes, allergies to medications, adverse drug reactions, diagnosis change, or new procedure performed?: [x] No    [] Yes (see summary sheet for update)        OBJECTIVE  Modality rationale: decrease edema and decrease pain to improve the patients ability to move/heal optimally   Min Type Additional Details    [] Estim: []Att   []Unatt  []TENS instruct                 []IFC  []Premod []NMES                       []Other:  []w/US   []w/ice   []w/heat  Position:  Location:    []  Traction: [] Cervical       []Lumbar                       [] Prone          []Supine                       []Intermittent   []Continuous Lbs:  [] before manual  [] after manual    []  Ultrasound: []Continuous   [] Pulsed                           []1MHz   []3MHz Location:  W/cm2:    []  Iontophoresis with dexamethasone         Location: [] Take home patch   [] In clinic    []  Ice     []  heat  []  Ice massage Position:   Location:    10 []  Vasopneumatic Device Pressure: [] lo [] med [] hi   Temp: [] lo [] med [] hi           47 min Therapeutic Exercise:  [x] See flow sheet :   Rationale: increase ROM and increase strength to improve the patients ability to restore PLOF               With TE  TA   NR  GT   Misc Patient Education: [x] Review HEP    [] Progressed/Changed HEP based on:   [] positioning   [] body mechanics   [] transfers   [] heat/ice application          Pain Level (0-10

## 2023-03-27 ENCOUNTER — OFFICE VISIT (OUTPATIENT)
Age: 63
End: 2023-03-27

## 2023-03-27 ENCOUNTER — HOSPITAL ENCOUNTER (OUTPATIENT)
Age: 63
Setting detail: RECURRING SERIES
Discharge: HOME | End: 2023-03-30
Payer: COMMERCIAL

## 2023-03-27 DIAGNOSIS — M25.562 LEFT KNEE PAIN, UNSPECIFIED CHRONICITY: Primary | ICD-10-CM

## 2023-03-27 PROCEDURE — 97110 THERAPEUTIC EXERCISES: CPT

## 2023-03-27 PROCEDURE — 99024 POSTOP FOLLOW-UP VISIT: CPT | Performed by: ORTHOPAEDIC SURGERY

## 2023-03-27 PROCEDURE — 97016 VASOPNEUMATIC DEVICE THERAPY: CPT

## 2023-03-27 NOTE — PROGRESS NOTES
PT DAILY TREATMENT NOTE 8-14    Patient Name: Araceli Beck  Date:3/27/2023  : 1960  [x]  Patient  Verified  Payor: Alejandra López / Plan: Jamila Perez / Product Type: *No Product type* /    In time:12:15  Out time:13:08  Total Treatment Time (min): 53  Total Timed Codes (min): 43  1:1 Treatment Time (min): 43   Visit # 12 of 30      Treatment Area: Presence of left artificial knee joint [Z96.652]    SUBJECTIVE  Pt without new complaints upon arrival today, states that she plans to RTW next month.     Pain Level (0-10 scale): 0/10  Any medication changes, allergies to medications, adverse drug reactions, diagnosis change, or new procedure performed?: [x] No    [] Yes (see summary sheet for update)        OBJECTIVE  Modality rationale: decrease edema and decrease pain to improve the patients ability to move/heal optimally   Min Type Additional Details    [] Estim: []Att   []Unatt  []TENS instruct                 []IFC  []Premod []NMES                       []Other:  []w/US   []w/ice   []w/heat  Position:  Location:    []  Traction: [] Cervical       []Lumbar                       [] Prone          []Supine                       []Intermittent   []Continuous Lbs:  [] before manual  [] after manual    []  Ultrasound: []Continuous   [] Pulsed                           []1MHz   []3MHz Location:  W/cm2:    []  Iontophoresis with dexamethasone         Location: [] Take home patch   [] In clinic    []  Ice     []  heat  []  Ice massage Position:   Location:    10 []  Vasopneumatic Device Pressure: [] lo [] med [] hi   Temp: [] lo [] med [] hi           43 min Therapeutic Exercise:  [x] See flow sheet :   Rationale: increase ROM and increase strength to improve the patients ability to restore PLOF               With TE  TA   NR  GT   Misc Patient Education: [x] Review HEP    [] Progressed/Changed HEP based on:   [] positioning   [] body mechanics   [] transfers   [] heat/ice application          Pain Level (0-10

## 2023-03-27 NOTE — PROGRESS NOTES
Name: Marivel Alvarez    : 1960     [address] [facility]  [address] [place] [address] [place], [facility] [address]  Dept: [phone]  Dept Fax: [phone]     Chief Complaint   Patient presents with    Post-Op Check    Knee Pain        There were no vitals taken for this visit. Allergies   Allergen Reactions    Tetracyclines & Related Nausea And Vomiting        Current Outpatient Medications   Medication Sig Dispense Refill    Cholecalciferol (VITAMIN D3) 1.25 MG (68275 UT) CAPS Take 1 capsule by mouth daily      hydroCHLOROthiazide (MICROZIDE) 12.5 MG capsule Take 12.5 mg by mouth daily      folic acid (FOLVITE) 1 MG tablet Take 1 mg by mouth daily      ascorbic acid (VITAMIN C) 500 MG tablet Take 500 mg by mouth daily      diclofenac sodium (VOLTAREN) 1 % GEL Apply 1 g topically every 4 hours as needed      ibuprofen (ADVIL;MOTRIN) 600 MG tablet Take 600 mg by mouth every 8 hours as needed      meloxicam (MOBIC) 15 MG tablet ceived the following from [facility]: Outside name: meloxicam (MOBIC) 15 mg tablet      methotrexate (RHEUMATREX) 2.5 MG chemo tablet TAKE 6 TABLET BY MOUTH ONCE WEEKLY       No current facility-administered medications for this visit. Patient Active Problem List   Diagnosis    Abnormal weight loss    Anemia    Arthralgia of left knee    Bone pain    Hypertension    Hypertrophy of breast    Malignant neoplasm of breast (female), unspecified site    Multiple pulmonary nodules    Other dietary vitamin B12 deficiency anemia    Strain of left knee    Vitamin D deficiency      History reviewed. No pertinent family history.    Social History     Socioeconomic History    Marital status: Single     Spouse name: None    Number of children: None    Years of education: None    Highest education level: None   Tobacco Use    Smoking status: Never    Smokeless tobacco: Never   Vaping Use    Vaping Use:

## 2023-03-27 NOTE — LETTER
3/27/2023      RE: Sasha Gaxiola      To Whom It May Concern,    This is to certify that Sasha Gaxiola may return to work 4/25/23 with no restrictions. Please feel free to contact my office if you have any questions or concerns. Sincerely,  Qasim Hills MD

## 2023-03-30 ENCOUNTER — HOSPITAL ENCOUNTER (OUTPATIENT)
Age: 63
Setting detail: RECURRING SERIES
End: 2023-03-30
Payer: COMMERCIAL

## 2023-03-30 PROCEDURE — 97110 THERAPEUTIC EXERCISES: CPT

## 2023-03-30 PROCEDURE — 97016 VASOPNEUMATIC DEVICE THERAPY: CPT

## 2023-03-30 NOTE — PROGRESS NOTES
PT DAILY TREATMENT NOTE 8-14    Patient Name: Katina Velez  Date:3/30/2023  : 1960  [x]  Patient  Verified  Payor: Joshua Vegas / Plan: Stefanie Pac / Product Type: *No Product type* /    In time: 09:52  Out time:1045  Total Treatment Time (min): 53  Total Timed Codes (min): 43  1:1 Treatment Time (min):  43  Visit # 13 of 30      Treatment Area: Presence of left artificial knee joint [Z96.652]    SUBJECTIVE  Pt without new complaints upon arrival today, states that she plans to RTW next month.     Pain Level (0-10 scale): 0/10  Any medication changes, allergies to medications, adverse drug reactions, diagnosis change, or new procedure performed?: [x] No    [] Yes (see summary sheet for update)        OBJECTIVE  Modality rationale: decrease edema and decrease pain to improve the patients ability to move/heal optimally   Min Type Additional Details    [] Estim: []Att   []Unatt  []TENS instruct                 []IFC  []Premod []NMES                       []Other:  []w/US   []w/ice   []w/heat  Position:  Location:    []  Traction: [] Cervical       []Lumbar                       [] Prone          []Supine                       []Intermittent   []Continuous Lbs:  [] before manual  [] after manual    []  Ultrasound: []Continuous   [] Pulsed                           []1MHz   []3MHz Location:  W/cm2:    []  Iontophoresis with dexamethasone         Location: [] Take home patch   [] In clinic    []  Ice     []  heat  []  Ice massage Position:   Location:    10 [x]  Vasopneumatic Device Pressure: [x] lo [] med [] hi   Temp: [x] lo [] med [] hi           43 min Therapeutic Exercise:  [x] See flow sheet :   Rationale: increase ROM and increase strength to improve the patients ability to restore PLOF               With TE  TA   NR  GT   Misc Patient Education: [x] Review HEP    [] Progressed/Changed HEP based on:   [] positioning   [] body mechanics   [] transfers   [] heat/ice application          Pain Level (0-10 scale) post treatment: 0/10    ASSESSMENT/Changes in Function: Session initiated with stationary bike to promote knee flexion & reciprocal motion. Followed by standing self-stretching using the slant box. Continued with TKE increasing to plate 5 and leg press at 33# for strength. Pt is is able to perform calf raises in standing. Step up and downs performed as documented on flow sheet. All exercise documented on flow sheet. Vaso applied to L knee post treatment. Continue with POC including stationary bike and functional strengthening over coming weeks to aid in transition to independence with HEP.        []  See Plan of Care  [x]  See progress note/recertification  []  See Discharge Summary             PLAN  []  Upgrade activities as tolerated     []  Continue plan of care  [x]  Update interventions per flow sheet       []  Discharge due to:_  []  Other:_      Denia Singh, PTA 3/30/2023  1:45 PM

## 2023-04-04 ENCOUNTER — HOSPITAL ENCOUNTER (OUTPATIENT)
Age: 63
Setting detail: RECURRING SERIES
Discharge: HOME | End: 2023-04-07
Payer: COMMERCIAL

## 2023-04-04 PROCEDURE — 97110 THERAPEUTIC EXERCISES: CPT

## 2023-04-04 NOTE — PROGRESS NOTES
Level (0-10 scale) post treatment: 0/10    ASSESSMENT/Changes in Function: Session initiated with stationary bike to promote knee flexion & reciprocal motion. Followed by standing self-stretching using the slant box. Continued with TKE increasing to plate 5 and leg press at 33# for strength. Pt is is able to perform calf raises in standing. Step up and downs performed as documented on flow sheet. Introduced lateral step ups, sports art knee flexion and knee extension. All exercise documented on flow sheet. CP applied to L knee post treatment. Continue with POC including stationary bike and functional strengthening over coming weeks to aid in transition to independence with HEP.        []  See Plan of Care  [x]  See progress note/recertification  []  See Discharge Summary             PLAN  []  Upgrade activities as tolerated     []  Continue plan of care  [x]  Update interventions per flow sheet       []  Discharge due to:_  []  Other:_      Edis Loo, SHAY 4/4/2023  11:17 AM

## 2023-04-13 ENCOUNTER — HOSPITAL ENCOUNTER (OUTPATIENT)
Age: 63
Setting detail: RECURRING SERIES
Discharge: HOME | End: 2023-04-16
Payer: COMMERCIAL

## 2023-04-13 PROCEDURE — 97110 THERAPEUTIC EXERCISES: CPT

## 2023-04-18 ENCOUNTER — APPOINTMENT (OUTPATIENT)
Age: 63
End: 2023-04-18
Payer: COMMERCIAL

## 2023-04-20 ENCOUNTER — HOSPITAL ENCOUNTER (OUTPATIENT)
Age: 63
Setting detail: RECURRING SERIES
Discharge: HOME | End: 2023-04-23
Payer: COMMERCIAL

## 2023-04-20 PROCEDURE — 97110 THERAPEUTIC EXERCISES: CPT

## 2023-04-20 PROCEDURE — 97164 PT RE-EVAL EST PLAN CARE: CPT

## 2023-06-19 ENCOUNTER — OFFICE VISIT (OUTPATIENT)
Age: 63
End: 2023-06-19
Payer: COMMERCIAL

## 2023-06-19 DIAGNOSIS — M19.071 ARTHRITIS OF RIGHT ANKLE: Primary | ICD-10-CM

## 2023-06-19 PROBLEM — I10 HYPERTENSION: Status: ACTIVE | Noted: 2021-08-15

## 2023-06-19 PROCEDURE — 99213 OFFICE O/P EST LOW 20 MIN: CPT | Performed by: ORTHOPAEDIC SURGERY

## 2023-06-19 NOTE — PROGRESS NOTES
Name: Tommy Paez    : 1960     [facility] [address] [place] [address] AND SPORTS [address] [place], [facility] [address]  Dept: [phone]  Dept Fax: [phone]     Chief Complaint   Patient presents with    Ankle Pain        There were no vitals taken for this visit. Allergies   Allergen Reactions    Tetracyclines & Related Nausea And Vomiting        Current Outpatient Medications   Medication Sig Dispense Refill    Cholecalciferol (VITAMIN D3) 1.25 MG (97284 UT) CAPS Take 1 capsule by mouth daily      hydroCHLOROthiazide (MICROZIDE) 12.5 MG capsule Take 12.5 mg by mouth daily      folic acid (FOLVITE) 1 MG tablet Take 1 mg by mouth daily      ascorbic acid (VITAMIN C) 500 MG tablet Take 500 mg by mouth daily      diclofenac sodium (VOLTAREN) 1 % GEL Apply 1 g topically every 4 hours as needed      ibuprofen (ADVIL;MOTRIN) 600 MG tablet Take 600 mg by mouth every 8 hours as needed      meloxicam (MOBIC) 15 MG tablet ceived the following from [facility]: Outside name: meloxicam (MOBIC) 15 mg tablet      methotrexate (RHEUMATREX) 2.5 MG chemo tablet TAKE 6 TABLET BY MOUTH ONCE WEEKLY       No current facility-administered medications for this visit. Patient Active Problem List   Diagnosis    Abnormal weight loss    Anemia    Arthralgia of left knee    Bone pain    Hypertension    Acquired absence of breast and nipple    Malignant neoplasm of female breast (HCC)    Multiple pulmonary nodules    Other dietary vitamin B12 deficiency anemia    Strain of left knee    Vitamin D deficiency    Ptosis of breast    Disproportion of reconstructed breast    Cerumen debris on tympanic membrane    Capsular contracture of breast implant      History reviewed. No pertinent family history.    Social History     Socioeconomic History    Marital status: Single     Spouse name: None    Number of children: None    Years of education: None

## 2023-06-19 NOTE — PATIENT INSTRUCTIONS
Ankle Sprain: Care Instructions  Overview     An ankle sprain can happen when you twist your ankle. The ligaments that support the ankle can get stretched and torn. Often the ankle is swollen and painful. Ankle sprains may take from several weeks to several months to heal. Usually, the more pain and swelling you have, the more severe your ankle sprain is and the longer it will take to heal. You can heal faster and regain strength in your ankle with good home treatment. It is very important to give your ankle time to heal completely, so that you do not easily hurt your ankle again. Follow-up care is a key part of your treatment and safety. Be sure to make and go to all appointments, and call your doctor if you are having problems. It's also a good idea to know your test results and keep a list of the medicines you take. How can you care for yourself at home? Prop up your foot on pillows as much as possible for the next 3 days. Try to keep your ankle above the level of your heart. This will help reduce the swelling. Follow your doctor's directions for wearing a splint or elastic bandage. Wrapping the ankle may help reduce or prevent swelling. Your doctor may give you a splint, a brace, an air stirrup, or another form of ankle support to protect your ankle until it is healed. Wear it as directed while your ankle is healing. Do not remove it unless your doctor tells you to. After your ankle has healed, ask your doctor whether you should wear the brace when you exercise. Put ice or cold packs on your injured ankle for 10 to 20 minutes at a time. Try to do this every 1 to 2 hours for the next 3 days (when you are awake) or until the swelling goes down. Put a thin cloth between the ice and your skin. You may need to use crutches until you can walk without pain. If you do use crutches, try to bear some weight on your injured ankle if you can do so without pain.  This helps the ankle heal.  Take pain medicines

## 2023-06-27 DIAGNOSIS — M25.562 CHRONIC PAIN OF LEFT KNEE: Primary | ICD-10-CM

## 2023-06-27 DIAGNOSIS — G89.29 CHRONIC PAIN OF LEFT KNEE: Primary | ICD-10-CM

## 2024-06-18 DIAGNOSIS — Z96.652 STATUS POST LEFT KNEE REPLACEMENT: Primary | ICD-10-CM

## 2024-06-18 DIAGNOSIS — M25.562 LEFT KNEE PAIN, UNSPECIFIED CHRONICITY: ICD-10-CM

## (undated) DEVICE — GARMENT COMPR M FOR 13IN FT INTMIT SGL BLDR HEM FORC II

## (undated) DEVICE — APPLICATOR MEDICATED 26 CC SOLUTION HI LT ORNG CHLORAPREP

## (undated) DEVICE — SYSTEM SKIN CLSR 60CM 2-OCTYL CYNOACRLT W/ MESH DISPNS

## (undated) DEVICE — 3M™ IOBAN™ 2 ANTIMICROBIAL INCISE DRAPE 6650EZ: Brand: IOBAN™ 2

## (undated) DEVICE — BNDG,ELSTC,MATRIX,STRL,6"X5YD,LF,HOOK&LP: Brand: MEDLINE

## (undated) DEVICE — HOOD WITH PEEL AWAY FACE SHIELD: Brand: T7PLUS

## (undated) DEVICE — SUTURE VCRL + SZ 2-0 L27IN ABSRB UD CT-2 L26MM 1/2 CIR TAPR VCP269H

## (undated) DEVICE — ATTUNE SOLO PINNING SYSTEM

## (undated) DEVICE — BANDAGE COBAN 4 IN COMPR W4INXL5YD FOAM COHESIVE QUIK STK SELF ADH SFT

## (undated) DEVICE — SUTURE VCRL + SZ 1 L27IN ANTIBACTERIAL POLYGLACTIN 910 W VCP281H

## (undated) DEVICE — GLOVE SURG SZ 65 L12IN FNGR THK79MIL GRN LTX FREE

## (undated) DEVICE — GOWN,PRECEPT, XLNG/XLRG ,STRL: Brand: MEDLINE

## (undated) DEVICE — COVER,TABLE,HEAVY DUTY,77"X90",STRL: Brand: MEDLINE

## (undated) DEVICE — ZIMMER® STERILE DISPOSABLE TOURNIQUET CUFF WITH PLC, DUAL PORT, SINGLE BLADDER, 30 IN. (76 CM)

## (undated) DEVICE — GLOVE ORANGE PI 8   MSG9080

## (undated) DEVICE — HYPODERMIC SAFETY NEEDLE: Brand: MAGELLAN

## (undated) DEVICE — GLOVE SURG SZ 65 THK91MIL LTX FREE SYN POLYISOPRENE

## (undated) DEVICE — GOWN,AURORA,FABRIC-REINFORCED,X-LARGE: Brand: MEDLINE

## (undated) DEVICE — GLOVE ORANGE PI 8 1/2   MSG9085

## (undated) DEVICE — SOLUTION IRRIG 500ML 0.9% SOD CHLO USP POUR PLAS BTL

## (undated) DEVICE — INTENDED FOR TISSUE SEPARATION, AND OTHER PROCEDURES THAT REQUIRE A SHARP SURGICAL BLADE TO PUNCTURE OR CUT.: Brand: BARD-PARKER SAFETY BLADES SIZE 10, STERILE

## (undated) DEVICE — GLOVE ORANGE PI 7   MSG9070

## (undated) DEVICE — SPONGE LAP W18XL18IN WHT COT 4 PLY FLD STRUNG RADPQ DISP ST

## (undated) DEVICE — SHEET,DRAPE,70X100,STERILE: Brand: MEDLINE

## (undated) DEVICE — ELECTRODE BLDE L6.5IN CAUT EXT DISP

## (undated) DEVICE — AGENT HEMSTAT 3GM OXIDIZED REGENERATED CELOS ABSRB FOR CONT (ORDER MULTIPLES OF 5EA)

## (undated) DEVICE — STRYKER PERFORMANCE SERIES SAGITTAL BLADE: Brand: STRYKER PERFORMANCE SERIES

## (undated) DEVICE — DRESSING ANTIMIC FOAM OPTIFOAM POSTOP ADH 4X14 IN

## (undated) DEVICE — TOWEL,OR,DSP,ST,BLUE,STD,4/PK,20PK/CS: Brand: MEDLINE

## (undated) DEVICE — TOTAL KNEE PACK: Brand: MEDLINE INDUSTRIES, INC.

## (undated) DEVICE — SUTURE VCRL SZ 3-0 L27IN ABSRB UD L24MM PS-1 3/8 CIR PRIM J936H

## (undated) DEVICE — HANDPIECE SET WITH HIGH FLOW TIP AND SUCTION TUBE: Brand: INTERPULSE

## (undated) DEVICE — BOWL MIXING VAC PALABOWL PALACOS

## (undated) DEVICE — GLOVE SURG UNDERGLOVE 7.5 PF BLU BIOGEL PI MIC LF

## (undated) DEVICE — BLADE SAW W12.5XL70MM THK1MM RECIP DBL SIDE OFFSET

## (undated) DEVICE — BASIC SINGLE BASIN-LF: Brand: MEDLINE INDUSTRIES, INC.

## (undated) DEVICE — GLOVE SURG 7 BIOGEL PI ULTRATOUCH G

## (undated) DEVICE — GLOVE ORANGE PI 7 1/2   MSG9075

## (undated) DEVICE — DRAPE,TOP,102X53,STERILE: Brand: MEDLINE

## (undated) DEVICE — 4-PORT MANIFOLD: Brand: NEPTUNE 2

## (undated) DEVICE — 450 ML BOTTLE OF 0.05% CHLORHEXIDINE GLUCONATE IN 99.95% STERILE WATER FOR IRRIGATION, USP AND APPLICATOR.: Brand: IRRISEPT ANTIMICROBIAL WOUND LAVAGE

## (undated) DEVICE — SUTURE VCRL + SZ 0 L27IN ANTIBACTERIAL POLYGLACTIN 910 W VCP280H

## (undated) DEVICE — 3M™ TEGADERM™ HP TRANSPARENT FILM DRESSING FRAME STYLE, 9546HP, 4 IN X 4-1/2 IN (10 CM X 11.5 CM), 50/CT 4CT/CASE: Brand: 3M™ TEGADERM™